# Patient Record
Sex: MALE | Race: BLACK OR AFRICAN AMERICAN | Employment: OTHER | URBAN - METROPOLITAN AREA
[De-identification: names, ages, dates, MRNs, and addresses within clinical notes are randomized per-mention and may not be internally consistent; named-entity substitution may affect disease eponyms.]

---

## 2017-01-27 ENCOUNTER — OFFICE VISIT (OUTPATIENT)
Dept: INTERNAL MEDICINE CLINIC | Age: 77
End: 2017-01-27

## 2017-01-27 VITALS
SYSTOLIC BLOOD PRESSURE: 146 MMHG | WEIGHT: 143.8 LBS | DIASTOLIC BLOOD PRESSURE: 92 MMHG | OXYGEN SATURATION: 94 % | TEMPERATURE: 98.5 F | HEART RATE: 104 BPM | HEIGHT: 64 IN | BODY MASS INDEX: 24.55 KG/M2

## 2017-01-27 DIAGNOSIS — J06.9 UPPER RESPIRATORY TRACT INFECTION, UNSPECIFIED TYPE: Primary | ICD-10-CM

## 2017-01-27 DIAGNOSIS — J20.9 ACUTE BRONCHITIS, UNSPECIFIED ORGANISM: ICD-10-CM

## 2017-01-27 RX ORDER — CODEINE PHOSPHATE AND GUAIFENESIN 10; 100 MG/5ML; MG/5ML
5 SOLUTION ORAL
Qty: 180 ML | Refills: 3 | Status: SHIPPED | OUTPATIENT
Start: 2017-01-27 | End: 2018-12-31 | Stop reason: ALTCHOICE

## 2017-01-27 RX ORDER — AZITHROMYCIN 250 MG/1
TABLET, FILM COATED ORAL
Qty: 6 TAB | Refills: 0 | Status: SHIPPED | OUTPATIENT
Start: 2017-01-27 | End: 2017-02-01

## 2017-01-27 NOTE — MR AVS SNAPSHOT
Visit Information Date & Time Provider Department Dept. Phone Encounter #  
 1/27/2017  1:00 PM Kaycee Anderson MD [facility] [phone] [id number] Your Appointments 6/1/2017  9:00 AM  
Any with Oliva Olsen MD  
[address] [place] [address] and Primary Care [address] [facility]) Appt Note: 6 mo f/up Sharlene Laird 90 1 [facility]  
  
   
 Sharlene Laird 90 [id number] Upcoming Health Maintenance Date Due FOBT Q 1 YEAR AGE 50-75 6/16/2017 GLAUCOMA SCREENING Q2Y 6/29/2017 MEDICARE YEARLY EXAM 12/2/2017 DTaP/Tdap/Td series (2 - Td) 12/1/2026 Allergies as of 1/27/2017  Review Complete On: 1/27/2017 By: Cindy Manley No Known Allergies Current Immunizations  Never Reviewed No immunizations on file. Not reviewed this visit You Were Diagnosed With   
  
 Codes Comments Upper respiratory tract infection, unspecified type    -  Primary ICD-10-CM: J06.9 ICD-9-CM: 465.9 Vitals BP Pulse Temp Height(growth percentile) Weight(growth percentile) SpO2  
 (!) 146/92 (BP 1 Location: Left arm, BP Patient Position: Sitting) (!) 104 98.5 °F (36.9 °C) (Oral) 5' 4\" (1.626 m) 143 lb 12.8 oz (65.2 kg) 94% BMI Smoking Status 24.68 kg/m2 Never Smoker BMI and BSA Data Body Mass Index Body Surface Area  
 24.68 kg/m 2 1.72 m 2 Preferred Pharmacy Pharmacy Name Phone [facility][id number]- [place], [place] - [address] [phone] Your Updated Medication List  
  
   
This list is accurate as of: 1/27/17  3:02 PM.  Always use your most recent med list.  
  
  
  
  
 albuterol 2.5 mg /3 mL (0.083 %) nebulizer solution Commonly known as:  PROVENTIL VENTOLIN  
3 mL by Nebulization route every four (4) hours as needed for Wheezing. azithromycin 250 mg tablet Commonly known as:  Ailin Abu Take 2 tablets today, then take 1 tablet daily  
  
 guaiFENesin-codeine 100-10 mg/5 mL solution Commonly known as:  ROBITUSSIN AC Take 5 mL by mouth four (4) times daily as needed for Cough. Max Daily Amount: 20 mL. Prescriptions Printed Refills  
 guaiFENesin-codeine (ROBITUSSIN AC) 100-10 mg/5 mL solution 3 Sig: Take 5 mL by mouth four (4) times daily as needed for Cough. Max Daily Amount: 20 mL. Class: Print Route: Oral  
  
Prescriptions Sent to Pharmacy Refills  
 azithromycin (ZITHROMAX) 250 mg tablet 0 Sig: Take 2 tablets today, then take 1 tablet daily Class: Normal  
 Pharmacy: [facility]/pharmacy [address] [facility] [address] Ph #: [phone] Introducing [facility]! Leslie Hortno introduces [employer] patient portal. Now you can access parts of your medical record, email your doctor's office, and request medication refills online. 1. In your internet browser, go to [email or web address] 2. Click on the First Time User? Click Here link in the Sign In box. You will see the New Member Sign Up page. 3. Enter your [employer] Access Code exactly as it appears below. You will not need to use this code after youve completed the sign-up process. If you do not sign up before the expiration date, you must request a new code. · [employer] Access Code: [email or web address] Expires: 3/1/2017  1:38 PM 
 
4. Enter the last four digits of your Social Security Number (xxxx) and Date of Birth (mm/dd/yyyy) as indicated and click Submit. You will be taken to the next sign-up page. 5. Create a Loomt ID. This will be your [employer] login ID and cannot be changed, so think of one that is secure and easy to remember. 6. Create a Loomt password. You can change your password at any time. 7. Enter your Password Reset Question and Answer.  This can be used at a later time if you forget your password. 8. Enter your e-mail address. You will receive e-mail notification when new information is available in [address] 9. Click Sign Up. You can now view and download portions of your medical record. 10. Click the Download Summary menu link to download a portable copy of your medical information. If you have questions, please visit the Frequently Asked Questions section of the "[employer] website. Remember, "[employer] is NOT to be used for urgent needs. For medical emergencies, dial 911. Now available from your iPhone and Android! Please provide this summary of care documentation to your next provider. Your primary care clinician is listed as Barbara Brand. If you have any questions after today's visit, please call [phone].

## 2017-01-27 NOTE — PROGRESS NOTES
Chief Complaint   Patient presents with    Chest Congestion    Cold     grey phlem     1. Have you been to the ER, urgent care clinic since your last visit? Hospitalized since your last visit? No    2. Have you seen or consulted any other health care providers outside of the [address] [place] [address] since your last visit? Include any pap smears or colon screening.  No

## 2017-01-28 NOTE — PROGRESS NOTES
Chief Complaint   Patient presents with    Chest Congestion    Cold     grey phlem   . SUBECTIVE:    Kavya Max is a 78 y.o. male comes in complaining of a two day history of nasal congestion, coughing which is productive of yellowish mucus. He wishes to have something for this. Current Outpatient Prescriptions   Medication Sig Dispense Refill    guaiFENesin-codeine (ROBITUSSIN AC) 100-10 mg/5 mL solution Take 5 mL by mouth four (4) times daily as needed for Cough. Max Daily Amount: 20 mL. 180 mL 3    azithromycin (ZITHROMAX) 250 mg tablet Take 2 tablets today, then take 1 tablet daily 6 Tab 0    albuterol (PROVENTIL VENTOLIN) 2.5 mg /3 mL (0.083 %) nebulizer solution 3 mL by Nebulization route every four (4) hours as needed for Wheezing. 48 Each 11     Past Medical History   Diagnosis Date    Back pain     Brachycephaly 11/2006    Cancer [facility]) 2006    Cancer of prostate w/high recur risk (T3a or Darion 8-10 or PSA>20) ([facility] [address]     Colonoscopy refused 12/01/2016     not this year    Fall at home     Hard palate abscess     Lipoma of flank     PSA elevation     S/P colonoscopy 8-11-06    Toothache      History reviewed. No pertinent past surgical history.   No Known Allergies    REVIEW OF SYSTEMS:  Review of Systems - Negative except   ENT ROS: negative for - headaches, hearing change, nasal congestion, oral lesions, tinnitus, visual changes or   Respiratory ROS: no cough, shortness of breath, or wheezing  Cardiovascular ROS: no chest pain or dyspnea on exertion  Gastrointestinal ROS: no abdominal pain, change in bowel habits, or black or blood  Genito-Urinary ROS: no dysuria, trouble voiding, or hematuria  Musculoskeletal ROS: negative  Neurological ROS: no TIA or stroke symptoms      Social History     Social History    Marital status: SINGLE     Spouse name: N/A    Number of children: N/A    Years of education: N/A     Social History Main Topics    Smoking status: Never Smoker    Smokeless tobacco: Never Used    Alcohol use No    Drug use: No    Sexual activity: Not Asked     Other Topics Concern    None     Social History Narrative    Family History: Mother: , natural causesFather: , natural causesSister(s): aliveBrother(s): aliveDaughter(s):    aliveGrandmother: alive8 brother(s) , 5 sister(s) - healthy. 1daughter(s) - healthy. Social History: Alcohol Use Patient does not use alcohol. Smoking Status Patient is a former smoker, Quit in: 40 years ago. Caffeine: none,    coffee, per day, soda diet, occasional, tea. Marital Status: Single. Lives w ith: alone w ith grandson. Children: yes, daughters. Occupation/W ork:    retired.   r  History reviewed. No pertinent family history. OBJECTIVE:  Visit Vitals    BP (!) 146/92 (BP 1 Location: Left arm, BP Patient Position: Sitting)    Pulse (!) 104    Temp 98.5 °F (36.9 °C) (Oral)    Ht 5' 4\" (1.626 m)    Wt 143 lb 12.8 oz (65.2 kg)    SpO2 94%    BMI 24.68 kg/m2     ENT: perrla,  eom intact  NECK: supple. Thyroid normal, no JVD  CHEST: clear to ascultation and percussion   HEART: regular rate and rhythm  ABD: soft, bowel sounds active,   EXTREMITIES: no edema, pulse 1+  INTEGUMENT: clear      ASSESSMENT:  1. Upper respiratory tract infection, unspecified type    2. Acute bronchitis, unspecified organism        PLAN:  The patient has an upper respiratory infection complicated by bronchitis. He will be given a cough suppressant and an antibiotic. I suggest he also use Afrin nasal spray one spray in each nostril before he goes to bed at night. Interestingly he actually took Pseudoephedrine. I suggest he discontinue this. .  Orders Placed This Encounter    guaiFENesin-codeine (ROBITUSSIN AC) 100-10 mg/5 mL solution    azithromycin (ZITHROMAX) 250 mg tablet       Follow-up Disposition:  Return keep old apt.       Gregor Nelson MD

## 2017-02-17 RX ORDER — ALBUTEROL SULFATE 0.83 MG/ML
2.5 SOLUTION RESPIRATORY (INHALATION)
Qty: 50 EACH | Refills: 11 | Status: SHIPPED | OUTPATIENT
Start: 2017-02-17 | End: 2019-02-04 | Stop reason: SDUPTHER

## 2017-04-27 ENCOUNTER — TELEPHONE (OUTPATIENT)
Dept: INTERNAL MEDICINE CLINIC | Age: 77
End: 2017-04-27

## 2017-04-27 NOTE — TELEPHONE ENCOUNTER
Patient called to schedule yearly medicare wellness appointment. No answer, qasim was left to call office back.

## 2018-12-31 ENCOUNTER — OFFICE VISIT (OUTPATIENT)
Dept: INTERNAL MEDICINE CLINIC | Age: 78
End: 2018-12-31

## 2018-12-31 VITALS
SYSTOLIC BLOOD PRESSURE: 122 MMHG | HEIGHT: 64 IN | RESPIRATION RATE: 18 BRPM | OXYGEN SATURATION: 96 % | DIASTOLIC BLOOD PRESSURE: 81 MMHG | HEART RATE: 89 BPM | WEIGHT: 148.5 LBS | TEMPERATURE: 98.7 F | BODY MASS INDEX: 25.35 KG/M2

## 2018-12-31 DIAGNOSIS — R79.9 ABNORMAL FINDING OF BLOOD CHEMISTRY: ICD-10-CM

## 2018-12-31 DIAGNOSIS — M54.50 LEFT-SIDED LOW BACK PAIN WITHOUT SCIATICA, UNSPECIFIED CHRONICITY: ICD-10-CM

## 2018-12-31 DIAGNOSIS — J45.41 MODERATE PERSISTENT ASTHMATIC BRONCHITIS WITH ACUTE EXACERBATION: ICD-10-CM

## 2018-12-31 DIAGNOSIS — D17.1 LIPOMA OF FLANK: Primary | ICD-10-CM

## 2018-12-31 DIAGNOSIS — C61 PROSTATE CA (HCC): ICD-10-CM

## 2018-12-31 RX ORDER — CEFUROXIME AXETIL 250 MG/1
250 TABLET ORAL 2 TIMES DAILY
Qty: 10 TAB | Refills: 0 | Status: SHIPPED | OUTPATIENT
Start: 2018-12-31 | End: 2019-02-04 | Stop reason: ALTCHOICE

## 2018-12-31 NOTE — PROGRESS NOTES
[facility]  So Del Real MD, [address] [address]  Phone:  [phone]  Fax: [phone]       Chief Complaint   Patient presents with    Cold Symptoms   . SUBJECTIVE:    Ellis Tao is a 70 y.o. male Patient returns today with a known history of prostate cancer, back pain, bronchial asthma and is seen for evaluation. Over the past couple weeks he has complained of feeling tired, is fatigued, cough productive of mucopurulent sputum, rhinorrhea, chest discomfort aggravated by the cough and has tried Tylenol, Robitussin, Claritin-D, honey lemon and tea, soups, but has a sore throat, nose still running, chest still burns when he coughs, and is seen for evaluation. Current Outpatient Medications   Medication Sig Dispense Refill    cefUROXime (CEFTIN) 250 mg tablet Take 1 Tab by mouth two (2) times a day. 10 Tab 0    albuterol (PROVENTIL VENTOLIN) 2.5 mg /3 mL (0.083 %) nebulizer solution 3 mL by Nebulization route every four (4) hours as needed for Wheezing. 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 No pertinent surgical history.   No Known Allergies      REVIEW OF SYSTEMS:  General: negative for - chills or fever  ENT: negative for - headaches, nasal congestion or tinnitus  Respiratory: negative for - cough, hemoptysis, shortness of breath or wheezing  Cardiovascular : negative for - chest pain, edema, palpitations or shortness of breath  Gastrointestinal: negative for - abdominal pain, blood in stools, heartburn or nausea/vomiting  Genito-Urinary: no dysuria, trouble voiding, or hematuria  Musculoskeletal: negative for - gait disturbance, joint pain, joint stiffness or joint swelling  Neurological: no TIA or stroke symptoms  Hematologic: no bruises, no bleeding, no swollen glands  Integument: no lumps, mole changes, nail changes or rash  Endocrine: no malaise/lethargy or unexpected weight changes      Social History     Socioeconomic History    Marital status: SINGLE     Spouse name: Not on file    Number of children: Not on file    Years of education: Not on file    Highest education level: Not on file   Tobacco Use    Smoking status: Never Smoker    Smokeless tobacco: Never Used   Substance and Sexual Activity    Alcohol use: No    Drug use: No    Sexual activity: Yes     Partners: Female   Social History Narrative    Family History: Mother: , natural causesFather: , natural causesSister(s): aliveBrother(s): aliveDaughter(s):    aliveGrandmother: alive8 brother(s) , 5 sister(s) - healthy. 1daughter(s) - healthy. Social History: Alcohol Use Patient does not use alcohol. Smoking Status Patient is a former smoker, Quit in: 40 years ago. Caffeine: none,    coffee, per day, soda diet, occasional, tea. Marital Status: Single. Lives w ith: alone w ith grandson. Children: yes, daughters. Occupation/W ork:    retired. History reviewed. No pertinent family history. OBJECTIVE:    Visit Vitals  /81   Pulse 89   Temp 98.7 °F (37.1 °C) (Oral)   Resp 18   Ht 5' 4\" (1.626 m)   Wt 148 lb 8 oz (67.4 kg)   SpO2 96%   BMI 25.49 kg/m²     CONSTITUTIONAL: well , well nourished, appears age appropriate  EYES: perrla, eom intact  ENMT:moist mucous membranes, pharynx clear  NECK: supple. Thyroid normal  RESPIRATORY: Chest: clear bilaterally   CARDIOVASCULAR: Heart: regular rate and rhythm  GASTROINTESTINAL: Abdomen: soft, bowel sounds active  HEMATOLOGIC: no pathological lymph nodes palpated  MUSCULOSKELETAL: Extremities: no edema, pulse 1+   INTEGUMENT: No unusual rashes or suspicious skin lesions noted.  Nails appear normal.  NEUROLOGIC: non-focal exam   MENTAL STATUS: alert and oriented, appropriate affect           ASSESSMENT:  1. Lipoma of flank    2. Left-sided low back pain without sciatica, unspecified chronicity    3. Prostate CA ([address]    4. Moderate persistent asthmatic bronchitis with acute exacerbation    5. Abnormal finding of blood chemistry       The patient's symptoms are compatible with acute asthmatic bronchitis, for which we will add Ceftin twice a day for the next seven days. Use the jet nebs on a regular basis until the coughing subsides, which I think is a variant of cough variant asthma or reactive bronchospasm related to the upper respiratory symptoms. His BMI is at his ideal body weight. His blood pressure control is excellent. He will be back to see us as previously scheduled or in about a year. Patient has not had follow up of his prostate cancer for at least two years or since he last saw Dr. Valencia. Will refer him to Dr. Laura Gurrola for follow up and also ask for a PSA today. I have discussed the diagnosis with the patient and the intended plan as seen in the  orders above. The patient understands and agees with the plan. The patient has   received an after visit summary and questions were answered concerning  future plans  Patient labs and/or xrays were reviewed  Past records were reviewed. PLAN:  .  Orders Placed This Encounter    URINALYSIS W/ RFLX MICROSCOPIC    CBC WITH AUTOMATED DIFF    METABOLIC PANEL, COMPREHENSIVE    LIPID PANEL    HEMOGLOBIN A1C WITH EAG    PROSTATE SPECIFIC AG    REFERRAL TO UROLOGY    cefUROXime (CEFTIN) 250 mg tablet       Follow-up Disposition:  Return in about 1 year (around 12/31/2019). ATTENTION:   This medical record was transcribed using an electronic medical records system. Although proofread, it may and can contain electronic and spelling errors. Other human spelling and other errors may be present.   Corrections may be executed at a later time. Please feel free to contact us for any clarifications as needed.

## 2018-12-31 NOTE — PROGRESS NOTES
1. Have you been to the ER, urgent care clinic since your last visit? Hospitalized since your last visit? No    2. Have you seen or consulted any other health care providers outside of the [facility] since your last visit? Include any pap smears or colon screening.  No    complains of  Cold symptoms

## 2019-01-01 LAB
ALBUMIN SERPL-MCNC: 4.1 G/DL (ref 3.5–4.8)
ALBUMIN/GLOB SERPL: 1.3 {RATIO} (ref 1.2–2.2)
ALP SERPL-CCNC: 73 IU/L (ref 39–117)
ALT SERPL-CCNC: 13 IU/L (ref 0–44)
APPEARANCE UR: CLEAR
AST SERPL-CCNC: 23 IU/L (ref 0–40)
BASOPHILS # BLD AUTO: 0 X10E3/UL (ref 0–0.2)
BASOPHILS NFR BLD AUTO: 1 %
BILIRUB SERPL-MCNC: 1.1 MG/DL (ref 0–1.2)
BILIRUB UR QL STRIP: NEGATIVE
BUN SERPL-MCNC: 16 MG/DL (ref 8–27)
BUN/CREAT SERPL: 14 (ref 10–24)
CALCIUM SERPL-MCNC: 9 MG/DL (ref 8.6–10.2)
CHLORIDE SERPL-SCNC: 104 MMOL/L (ref 96–106)
CHOLEST SERPL-MCNC: 214 MG/DL (ref 100–199)
CO2 SERPL-SCNC: 26 MMOL/L (ref 20–29)
COLOR UR: YELLOW
CREAT SERPL-MCNC: 1.13 MG/DL (ref 0.76–1.27)
EOSINOPHIL # BLD AUTO: 0.2 X10E3/UL (ref 0–0.4)
EOSINOPHIL NFR BLD AUTO: 3 %
ERYTHROCYTE [DISTWIDTH] IN BLOOD BY AUTOMATED COUNT: 12.4 % (ref 12.3–15.4)
EST. AVERAGE GLUCOSE BLD GHB EST-MCNC: 100 MG/DL
GLOBULIN SER CALC-MCNC: 3.1 G/DL (ref 1.5–4.5)
GLUCOSE SERPL-MCNC: 76 MG/DL (ref 65–99)
GLUCOSE UR QL: NEGATIVE
HBA1C MFR BLD: 5.1 % (ref 4.8–5.6)
HCT VFR BLD AUTO: 44.3 % (ref 37.5–51)
HDLC SERPL-MCNC: 63 MG/DL
HGB BLD-MCNC: 14.1 G/DL (ref 13–17.7)
HGB UR QL STRIP: NEGATIVE
IMM GRANULOCYTES # BLD: 0 X10E3/UL (ref 0–0.1)
IMM GRANULOCYTES NFR BLD: 0 %
KETONES UR QL STRIP: NEGATIVE
LDLC SERPL CALC-MCNC: 135 MG/DL (ref 0–99)
LEUKOCYTE ESTERASE UR QL STRIP: NEGATIVE
LYMPHOCYTES # BLD AUTO: 2.5 X10E3/UL (ref 0.7–3.1)
LYMPHOCYTES NFR BLD AUTO: 36 %
MCH RBC QN AUTO: 33.7 PG (ref 26.6–33)
MCHC RBC AUTO-ENTMCNC: 31.8 G/DL (ref 31.5–35.7)
MCV RBC AUTO: 106 FL (ref 79–97)
MICRO URNS: NORMAL
MONOCYTES # BLD AUTO: 0.8 X10E3/UL (ref 0.1–0.9)
MONOCYTES NFR BLD AUTO: 12 %
NEUTROPHILS # BLD AUTO: 3.4 X10E3/UL (ref 1.4–7)
NEUTROPHILS NFR BLD AUTO: 48 %
NITRITE UR QL STRIP: NEGATIVE
PH UR STRIP: 6 [PH] (ref 5–7.5)
PLATELET # BLD AUTO: 243 X10E3/UL (ref 150–379)
POTASSIUM SERPL-SCNC: 4.6 MMOL/L (ref 3.5–5.2)
PROT SERPL-MCNC: 7.2 G/DL (ref 6–8.5)
PROT UR QL STRIP: NEGATIVE
PSA SERPL-MCNC: 0.7 NG/ML (ref 0–4)
RBC # BLD AUTO: 4.18 X10E6/UL (ref 4.14–5.8)
SODIUM SERPL-SCNC: 144 MMOL/L (ref 134–144)
SP GR UR: 1.02 (ref 1–1.03)
TRIGL SERPL-MCNC: 79 MG/DL (ref 0–149)
UROBILINOGEN UR STRIP-MCNC: 0.2 MG/DL (ref 0.2–1)
VLDLC SERPL CALC-MCNC: 16 MG/DL (ref 5–40)
WBC # BLD AUTO: 6.9 X10E3/UL (ref 3.4–10.8)

## 2019-02-04 ENCOUNTER — OFFICE VISIT (OUTPATIENT)
Dept: INTERNAL MEDICINE CLINIC | Age: 79
End: 2019-02-04

## 2019-02-04 VITALS
BODY MASS INDEX: 24.59 KG/M2 | TEMPERATURE: 98.5 F | HEART RATE: 100 BPM | HEIGHT: 64 IN | DIASTOLIC BLOOD PRESSURE: 80 MMHG | WEIGHT: 144 LBS | SYSTOLIC BLOOD PRESSURE: 130 MMHG | RESPIRATION RATE: 18 BRPM | OXYGEN SATURATION: 95 %

## 2019-02-04 DIAGNOSIS — C61 PROSTATE CA (HCC): ICD-10-CM

## 2019-02-04 DIAGNOSIS — M54.50 LEFT-SIDED LOW BACK PAIN WITHOUT SCIATICA, UNSPECIFIED CHRONICITY: Primary | ICD-10-CM

## 2019-02-04 RX ORDER — ALBUTEROL SULFATE 0.83 MG/ML
2.5 SOLUTION RESPIRATORY (INHALATION)
Qty: 50 EACH | Refills: 11 | Status: SHIPPED | OUTPATIENT
Start: 2019-02-04 | End: 2019-05-10 | Stop reason: SDUPTHER

## 2019-02-04 NOTE — PROGRESS NOTES
[facility]  Judi Morel MD, [address] [address]  Phone:  [phone]  Fax: [phone]      Chief Complaint   Patient presents with    Cough     pt states he has been coughing up mucous but it has been clear. SUBECTIVE:    Amy Smith is a 70 y.o. male Patient returns today with recent episode of acute asthmatic bronchitis with a history of low back pain, prostate cancer, lipoma of the flank, and is seen for evaluation. Patient returns today complaining of headaches, continued cough, as well as chest discomfort. Tried Robitussin Night Max, Tylenol. He was in contact with mold in a building that was less than ideal as far as air pollutants are concerned and is seen for evaluation. Complains of a cough productive of mucoid sputum. Current Outpatient Medications   Medication Sig Dispense Refill    albuterol (PROVENTIL VENTOLIN) 2.5 mg /3 mL (0.083 %) nebulizer solution 3 mL by Nebulization route every four (4) hours as needed for Wheezing. 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 [address] 2006    PSA elevation     S/P colonoscopy 8-11-06    Toothache      History reviewed. No pertinent surgical history. No Known Allergies    REVIEW OF SYSTEMS:   No hemoptysis. He has one tube of Albuterol left and it seemed to help last night.         Social History     Socioeconomic History    Marital status: SINGLE     Spouse name: Not on file    Number of children: Not on file    Years of education: Not on file    Highest education level: Not on file   Tobacco Use    Smoking status: Never Smoker    Smokeless tobacco: Never Used   Substance and Sexual Activity    Alcohol use: No    Drug use: No    Sexual activity: Yes     Partners: Female   Social History Narrative    Family History: Mother: , natural causesFather: , natural causesSister(s): aliveBrother(s): aliveDaughter(s):    aliveGrandmother: alive8 brother(s) , 5 sister(s) - healthy. 1daughter(s) - healthy. Social History: Alcohol Use Patient does not use alcohol. Smoking Status Patient is a former smoker, Quit in: 40 years ago. Caffeine: none,    coffee, per day, soda diet, occasional, tea. Marital Status: Single. Lives w ith: alone w ith grandson. Children: yes, daughters. Occupation/W ork:    retired.   r  History reviewed. No pertinent family history. OBJECTIVE:  Visit Vitals  /80   Pulse 100   Temp 98.5 °F (36.9 °C) (Oral)   Resp 18   Ht 5' 4\" (1.626 m)   Wt 144 lb (65.3 kg)   SpO2 95%   BMI 24.72 kg/m²     ENT: perrla,  eom intact  NECK: supple. Thyroid normal  CHEST: clear to ascultation and percussion   HEART: regular rate and rhythm  ABD: soft, bowel sounds active  EXTREMITIES: no edema, pulse 1+     Office Visit on 2018   Component Date Value Ref Range Status    Specific Gravity 2018 1.024  1.005 - 1.030 Final    pH (UA) 2018 6.0  5.0 - 7.5 Final    Color 2018 Yellow  Yellow Final    Appearance 2018 Clear  Clear Final    Leukocyte Esterase 2018 Negative  Negative Final    Protein 2018 Negative  Negative/Trace Final    Glucose 2018 Negative  Negative Final    Ketone 2018 Negative  Negative Final    Blood 2018 Negative  Negative Final    Bilirubin 2018 Negative  Negative Final    Urobilinogen 2018 0.2  0.2 - 1.0 mg/dL Final    Nitrites 2018 Negative  Negative Final    Microscopic Examination 2018 Comment   Final    Microscopic not indicated and not performed.     WBC 2018 6.9  3.4 - 10.8 x10E3/uL Final    RBC 2018 4.18  4.14 - 5.80 x10E6/uL Final    HGB 2018 14.1  13.0 - 17.7 g/dL Final    HCT 2018 44.3  37.5 - 51.0 % Final    MCV 12/31/2018 106* 79 - 97 fL Final    MCH 12/31/2018 33.7* 26.6 - 33.0 pg Final    MCHC 12/31/2018 31.8  31.5 - 35.7 g/dL Final    RDW 12/31/2018 12.4  12.3 - 15.4 % Final    PLATELET 01/47/9483 232  150 - 379 x10E3/uL Final    NEUTROPHILS 12/31/2018 48  Not Estab. % Final    Lymphocytes 12/31/2018 36  Not Estab. % Final    MONOCYTES 12/31/2018 12  Not Estab. % Final    EOSINOPHILS 12/31/2018 3  Not Estab. % Final    BASOPHILS 12/31/2018 1  Not Estab. % Final    ABS. NEUTROPHILS 12/31/2018 3.4  1.4 - 7.0 x10E3/uL Final    Abs Lymphocytes 12/31/2018 2.5  0.7 - 3.1 x10E3/uL Final    ABS. MONOCYTES 12/31/2018 0.8  0.1 - 0.9 x10E3/uL Final    ABS. EOSINOPHILS 12/31/2018 0.2  0.0 - 0.4 x10E3/uL Final    ABS. BASOPHILS 12/31/2018 0.0  0.0 - 0.2 x10E3/uL Final    IMMATURE GRANULOCYTES 12/31/2018 0  Not Estab. % Final    ABS. IMM. GRANS. 12/31/2018 0.0  0.0 - 0.1 x10E3/uL Final    Glucose 12/31/2018 76  65 - 99 mg/dL Final    BUN 12/31/2018 16  8 - 27 mg/dL Final    Creatinine 12/31/2018 1.13  0.76 - 1.27 mg/dL Final    GFR est non-AA 12/31/2018 63  >59 mL/min/1.73 Final    GFR est AA 12/31/2018 73  >59 mL/min/1.73 Final    BUN/Creatinine ratio 12/31/2018 14  10 - 24 Final    Sodium 12/31/2018 144  134 - 144 mmol/L Final    Potassium 12/31/2018 4.6  3.5 - 5.2 mmol/L Final    Chloride 12/31/2018 104  96 - 106 mmol/L Final    CO2 12/31/2018 26  20 - 29 mmol/L Final    Calcium 12/31/2018 9.0  8.6 - 10.2 mg/dL Final    Protein, total 12/31/2018 7.2  6.0 - 8.5 g/dL Final    Albumin 12/31/2018 4.1  3.5 - 4.8 g/dL Final    GLOBULIN, TOTAL 12/31/2018 3.1  1.5 - 4.5 g/dL Final    A-G Ratio 12/31/2018 1.3  1.2 - 2.2 Final    Bilirubin, total 12/31/2018 1.1  0.0 - 1.2 mg/dL Final    Alk.  phosphatase 12/31/2018 73  39 - 117 IU/L Final    AST (SGOT) 12/31/2018 23  0 - 40 IU/L Final    ALT (SGPT) 12/31/2018 13  0 - 44 IU/L Final    Cholesterol, total 12/31/2018 214* 100 - 199 mg/dL Final    Triglyceride 12/31/2018 79  0 - 149 mg/dL Final    HDL Cholesterol 12/31/2018 63  >39 mg/dL Final    VLDL, calculated 12/31/2018 16  5 - 40 mg/dL Final    LDL, calculated 12/31/2018 135* 0 - 99 mg/dL Final    Hemoglobin A1c 12/31/2018 5.1  4.8 - 5.6 % Final    Comment:          Prediabetes: 5.7 - 6.4           Diabetes: >6.4           Glycemic control for adults with diabetes: <7.0      Estimated average glucose 12/31/2018 100  mg/dL Final    Prostate Specific Ag 12/31/2018 0.7  0.0 - 4.0 ng/mL Final    Comment: Roche ECLIA methodology. According to the American Urological Association, Serum PSA should  decrease and remain at undetectable levels after radical  prostatectomy. The AUA defines biochemical recurrence as an initial  PSA value 0.2 ng/mL or greater followed by a subsequent confirmatory  PSA value 0.2 ng/mL or greater. Values obtained with different assay methods or kits cannot be used  interchangeably. Results cannot be interpreted as absolute evidence  of the presence or absence of malignant disease. ASSESSMENT:  1. Left-sided low back pain without sciatica, unspecified chronicity    2. Prostate CA (HCC)      The bronchospasm is flaring. We will renew the Albuterol solution for his jet neb machine. At this point he does not need another course of antibiotics. If he develops chills, fever or mucopurulent sputum then we suggest he call us. He otherwise can see us in a couple months, sooner if he has any further problems. I have discussed the diagnosis with the patient and the intended plan as seen in the  orders above. The patient understands and agees with the plan. The patient has   received an after visit summary and questions were answered concerning  future plans  Patient labs and/or xrays were reviewed  Past records were reviewed.     PLAN:  .  Orders Placed This Encounter    albuterol (PROVENTIL VENTOLIN) 2.5 mg /3 mL (0.083 %) nebulizer solution       Follow-up Disposition:  Return in about 3 months (around 5/4/2019). ATTENTION:   This medical record was transcribed using an electronic medical records system. Although proofread, it may and can contain electronic and spelling errors. Other human spelling and other errors may be present. Corrections may be executed at a later time. Please feel free to contact us for any clarifications as needed.

## 2019-02-04 NOTE — PROGRESS NOTES
Chief Complaint   Patient presents with    Cough     pt states he has been coughing up mucous but it has been clear. 1. Have you been to the ER, urgent care clinic since your last visit? Hospitalized since your last visit? No    2. Have you seen or consulted any other health care providers outside of the [address] [place] [address] since your last visit? Include any pap smears or colon screening.  No

## 2019-05-10 RX ORDER — ALBUTEROL SULFATE 0.83 MG/ML
2.5 SOLUTION RESPIRATORY (INHALATION)
Qty: 50 EACH | Refills: 11 | Status: SHIPPED | OUTPATIENT
Start: 2019-05-10 | End: 2020-05-17

## 2020-03-26 PROBLEM — Z85.46 H/O PROSTATE CANCER: Status: ACTIVE | Noted: 2020-03-26

## 2020-05-17 RX ORDER — ALBUTEROL SULFATE 0.83 MG/ML
2.5 SOLUTION RESPIRATORY (INHALATION)
Qty: 150 ML | Refills: 11 | Status: SHIPPED | OUTPATIENT
Start: 2020-05-17 | End: 2021-12-01

## 2021-04-01 ENCOUNTER — OFFICE VISIT (OUTPATIENT)
Dept: INTERNAL MEDICINE CLINIC | Age: 81
End: 2021-04-01

## 2021-04-01 VITALS
HEART RATE: 93 BPM | BODY MASS INDEX: 24.79 KG/M2 | SYSTOLIC BLOOD PRESSURE: 126 MMHG | RESPIRATION RATE: 20 BRPM | HEIGHT: 64 IN | DIASTOLIC BLOOD PRESSURE: 82 MMHG | WEIGHT: 145.2 LBS | TEMPERATURE: 98.1 F | OXYGEN SATURATION: 97 %

## 2021-04-01 DIAGNOSIS — M54.50 LEFT-SIDED LOW BACK PAIN WITHOUT SCIATICA, UNSPECIFIED CHRONICITY: ICD-10-CM

## 2021-04-01 DIAGNOSIS — C61 PROSTATE CA (HCC): ICD-10-CM

## 2021-04-01 DIAGNOSIS — Z13.31 SCREENING FOR DEPRESSION: ICD-10-CM

## 2021-04-01 DIAGNOSIS — R79.9 ABNORMAL FINDING OF BLOOD CHEMISTRY, UNSPECIFIED: ICD-10-CM

## 2021-04-01 DIAGNOSIS — J45.41 MODERATE PERSISTENT ASTHMATIC BRONCHITIS WITH ACUTE EXACERBATION: ICD-10-CM

## 2021-04-01 DIAGNOSIS — Z00.00 MEDICARE ANNUAL WELLNESS VISIT, SUBSEQUENT: Primary | ICD-10-CM

## 2021-04-01 PROBLEM — R07.89 OTHER CHEST PAIN: Status: ACTIVE | Noted: 2021-04-01

## 2021-04-01 PROCEDURE — G0439 PPPS, SUBSEQ VISIT: HCPCS | Performed by: INTERNAL MEDICINE

## 2021-04-01 PROCEDURE — 99213 OFFICE O/P EST LOW 20 MIN: CPT | Performed by: INTERNAL MEDICINE

## 2021-04-01 NOTE — PROGRESS NOTES
SPORTS MEDICINE AND PRIMARY CARE  Keven Ballard MD, [address] [address]  Phone:  [phone]  Fax: [phone]       Chief Complaint   Patient presents with    Complete Physical    Annual Wellness Visit   . SUBJECTIVE:    Brielle Soto is a 80 y.o. male Patient returns today concerned about some chest discomfort he had. 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 He also is concerned about allergies. He is going to have a COVID vaccine with both himself and [employer] next week. During the field trip last year, when driving the school bus, he developed nausea, headache, chest pain and took some Pepto Bismol and Robitussin DM with some relief. Patient is seen for evaluation. Current Outpatient Medications   Medication Sig Dispense Refill    albuterol (PROVENTIL VENTOLIN) 2.5 mg /3 mL (0.083 %) nebu 3 ML BY NEBULIZATION ROUTE EVERY FOUR (4) HOURS AS NEEDED FOR WHEEZING. 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 No pertinent surgical history.   No Known Allergies      REVIEW OF SYSTEMS:  General: negative for - chills or fever  ENT: negative for - headaches, nasal congestion or tinnitus  Respiratory: negative for - cough, hemoptysis, shortness of breath or wheezing  Cardiovascular : negative for - chest pain, edema, palpitations or shortness of breath  Gastrointestinal: negative for - abdominal pain, blood in stools, heartburn or nausea/vomiting  Genito-Urinary: no dysuria, trouble voiding, or hematuria  Musculoskeletal: negative for - gait disturbance, joint pain, joint stiffness or joint swelling  Neurological: no TIA or stroke symptoms  Hematologic: no bruises, no bleeding, no swollen glands  Integument: no lumps, mole changes, nail changes or rash  Endocrine: no malaise/lethargy or unexpected weight changes      Social History     Socioeconomic History    Marital status: SINGLE     Spouse name: Not on file    Number of children: Not on file    Years of education: Not on file    Highest education level: Not on file   Tobacco Use    Smoking status: Never Smoker    Smokeless tobacco: Never Used   Substance and Sexual Activity    Alcohol use: No    Drug use: No    Sexual activity: Yes     Partners: Female   Social History Narrative    Family History: Mother: , natural causesFather: , natural causesSister(s): aliveBrother(s): aliveDaughter(s):    aliveGrandmother: alive8 brother(s) , 5 sister(s) - healthy. 1daughter(s) - healthy. Social History: Alcohol Use Patient does not use alcohol. Smoking Status Patient is a former smoker, Quit in: 40 years ago. Caffeine: none,    coffee, per day, soda diet, occasional, tea. Marital Status: Single. Lives w ith: alone w ith grandson. Children: yes, daughters. Occupation/W ork:    retired. History reviewed. No pertinent family history. OBJECTIVE:    Visit Vitals  /82   Pulse 93   Temp 98.1 °F (36.7 °C) (Oral)   Resp 20   Ht 5' 4\" (1.626 m)   Wt 145 lb 3.2 oz (65.9 kg)   SpO2 97%   BMI 24.92 kg/m²     CONSTITUTIONAL: well , well nourished, appears age appropriate  EYES: perrla, eom intact  ENMT:moist mucous membranes, pharynx clear  NECK: supple.  Thyroid normal  RESPIRATORY: Chest: clear bilaterally   CARDIOVASCULAR: Heart: regular rate and rhythm  GASTROINTESTINAL: Abdomen: soft, bowel sounds active  HEMATOLOGIC: no pathological lymph nodes palpated  MUSCULOSKELETAL: Extremities: no edema, pulse 1+   INTEGUMENT: No unusual rashes or suspicious skin lesions noted. Nails appear normal.  NEUROLOGIC: non-focal exam   MENTAL STATUS: alert and oriented, appropriate affect           ASSESSMENT:  1. Moderate persistent asthmatic bronchitis with acute exacerbation    2. Medicare annual wellness visit, subsequent    3. Screening for depression    4. Prostate CA ([facility] [address]    5. Left-sided low back pain without sciatica, unspecified chronicity      Patient is feeling better. We will do a chest x-ray regarding anterior chest discomfort that is non cardiac in origin. He tried the nebulizer, which apparently gave him some relief. He does not have any bronchospasm on exam today. There is a history of prostate cancer and we will refer him to Dr. Mane Anguiano for evaluation of the PSA and we will send results of the PSA to Dr. Mane Anguiano, as well as to patient in the mail. He will be back to see us in about a year, sooner if he has any problems. I have discussed the diagnosis with the patient and the intended plan as seen in the  Orders. The patient understands and agees with the plan. The patient has   received an after visit summary and questions were answered concerning  future plans  Patient labs and/or xrays were reviewed  Past records were reviewed. PLAN:  . No orders of the defined types were placed in this encounter. ATTENTION:   This medical record was transcribed using an electronic medical records system. Although proofread, it may and can contain electronic and spelling errors. Other human spelling and other errors may be present. Corrections may be executed at a later time. Please feel free to contact us for any clarifications as needed.

## 2021-04-01 NOTE — PATIENT INSTRUCTIONS
Medicare Wellness Visit, Male    The best way to live healthy is to have a lifestyle where you eat a well-balanced diet, exercise regularly, limit alcohol use, and quit all forms of tobacco/nicotine, if applicable. Regular preventive services are another way to keep healthy. Preventive services (vaccines, screening tests, monitoring & exams) can help personalize your care plan, which helps you manage your own care. Screening tests can find health problems at the earliest stages, when they are easiest to treat. [facility] follows the current, evidence-based guidelines published by the [facility] ([facility]) when recommending preventive services for our patients. Because we follow these guidelines, sometimes recommendations change over time as research supports it. (For example, a prostate screening blood test is no longer routinely recommended for men with no symptoms). Of course, you and your doctor may decide to screen more often for some diseases, based on your risk and co-morbidities (chronic disease you are already diagnosed with). Preventive services for you include:  - Medicare offers their members a free annual wellness visit, which is time for you and your primary care provider to discuss and plan for your preventive service needs. Take advantage of this benefit every year!  -All adults over age 72 should receive the recommended pneumonia vaccines. Current USPSTF guidelines recommend a series of two vaccines for the best pneumonia protection.   -All adults should have a flu vaccine yearly and tetanus vaccine every 10 years.  -All adults age 48 and older should receive the shingles vaccines (series of two vaccines).        -All adults age 38-68 who are overweight should have a diabetes screening test once every three years.   -Other screening tests & preventive services for persons with diabetes include: an eye exam to screen for diabetic retinopathy, a kidney function test, a foot exam, and stricter control over your cholesterol.   -Cardiovascular screening for adults with routine risk involves an electrocardiogram (ECG) at intervals determined by the provider.   -Colorectal cancer screening should be done for adults age 54-65 with no increased risk factors for colorectal cancer. There are a number of acceptable methods of screening for this type of cancer. Each test has its own benefits and drawbacks. Discuss with your provider what is most appropriate for you during your annual wellness visit. The different tests include: colonoscopy (considered the best screening method), a fecal occult blood test, a fecal DNA test, and sigmoidoscopy.  -All adults born between [facility] should be screened once for Hepatitis C.  -An Abdominal Aortic Aneurysm (AAA) Screening is recommended for men age 73-68 who has ever smoked in their lifetime.      Here is a list of your current Health Maintenance items (your personalized list of preventive services) with a due date:  Health Maintenance Due   Topic Date Due    Hepatitis C Test  Never done    COVID-19 Vaccine (1) Never done    Shingles Vaccine (1 of 2) Never done    Pneumococcal Vaccine (1 of 1 - PPSV23) Never done

## 2021-04-01 NOTE — PROGRESS NOTES
Leena Franklin is a 80 y.o. male  Chief Complaint   Patient presents with    Complete Physical    Annual Wellness Visit     Health Maintenance Due   Topic Date Due    Hepatitis C Screening  Never done    COVID-19 Vaccine (1) Never done    Shingrix Vaccine Age 50> (1 of 2) Never done    Pneumococcal 65+ years (1 of 1 - PPSV23) Never done    Medicare Yearly Exam  03/14/2018     Visit Vitals  /82   Pulse 93   Temp 98.1 °F (36.7 °C) (Oral)   Resp 20   Ht 5' 4\" (1.626 m)   Wt 145 lb 3.2 oz (65.9 kg)   SpO2 97%   BMI 24.92 kg/m²     This is the Subsequent Medicare Annual Wellness Exam, performed 12 months or more after the Initial AWV or the last Subsequent AWV    I have reviewed the patient's medical history in detail and updated the computerized patient record. Depression Risk Factor Screening:     3 most recent PHQ Screens 2/4/2019   PHQ Not Done -   Little interest or pleasure in doing things Not at all   Feeling down, depressed, irritable, or hopeless Not at all   Total Score PHQ 2 0       Alcohol Risk Screen    Do you average more than 1 drink per night or more than 7 drinks a week: No    In the past three months have you have had more than 4 drinks containing alcohol on one occasion: No        Functional Ability and Level of Safety:    Hearing: Hearing is good. Activities of Daily Living: The home contains: no safety equipment. Patient does total self care      Ambulation: with no difficulty     Fall Risk:  Fall Risk Assessment, last 12 mths 2/4/2019   Able to walk? Yes   Fall in past 12 months?  No      Abuse Screen:  Patient is not abused       Cognitive Screening    Has your family/caregiver stated any concerns about your memory: no     Cognitive Screening: not necessary    Assessment/Plan   Education and counseling provided:  Are appropriate based on today's review and evaluation        Health Maintenance Due     Health Maintenance Due   Topic Date Due    Hepatitis C Screening  Never done    COVID-19 Vaccine (1) Never done    Shingrix Vaccine Age 50> (1 of 2) Never done    Pneumococcal 65+ years (1 of 1 - PPSV23) Never done    Medicare Yearly Exam  03/14/2018       Patient Care Team   Patient Care Team:  Julio Cesar Padilla MD as PCP - General (Internal Medicine)  Julio Cesar Padilla MD as PCP - [facility] Empaneled Provider    History     Patient Active Problem List   Diagnosis Code    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    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    Colonoscopy refused Z53.20    Asthmatic bronchitis J45.909    H/O prostate cancer Z85.46     Past Medical History:   Diagnosis Date    Asthmatic bronchitis     Back pain     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 No pertinent surgical history. Current Outpatient Medications   Medication Sig Dispense Refill    albuterol (PROVENTIL VENTOLIN) 2.5 mg /3 mL (0.083 %) nebu 3 ML BY NEBULIZATION ROUTE EVERY FOUR (4) HOURS AS NEEDED FOR WHEEZING. 150 mL 11     No Known Allergies    History reviewed. No pertinent family history. Social History     Tobacco Use    Smoking status: Never Smoker    Smokeless tobacco: Never Used   Substance Use Topics    Alcohol use:  No

## 2021-04-02 LAB
ALBUMIN SERPL-MCNC: 3.6 G/DL (ref 3.5–5)
ALBUMIN/GLOB SERPL: 1.1 {RATIO} (ref 1.1–2.2)
ALP SERPL-CCNC: 78 U/L (ref 45–117)
ALT SERPL-CCNC: 16 U/L (ref 12–78)
ANION GAP SERPL CALC-SCNC: 2 MMOL/L (ref 5–15)
APPEARANCE UR: CLEAR
AST SERPL-CCNC: 18 U/L (ref 15–37)
BASOPHILS # BLD: 0.1 K/UL (ref 0–0.1)
BASOPHILS NFR BLD: 2 % (ref 0–1)
BILIRUB SERPL-MCNC: 1 MG/DL (ref 0.2–1)
BILIRUB UR QL: NEGATIVE
BUN SERPL-MCNC: 19 MG/DL (ref 6–20)
BUN/CREAT SERPL: 19 (ref 12–20)
CALCIUM SERPL-MCNC: 8.7 MG/DL (ref 8.5–10.1)
CHLORIDE SERPL-SCNC: 110 MMOL/L (ref 97–108)
CHOLEST SERPL-MCNC: 210 MG/DL
CO2 SERPL-SCNC: 28 MMOL/L (ref 21–32)
COLOR UR: NORMAL
CREAT SERPL-MCNC: 1 MG/DL (ref 0.7–1.3)
DIFFERENTIAL METHOD BLD: ABNORMAL
EOSINOPHIL # BLD: 0.2 K/UL (ref 0–0.4)
EOSINOPHIL NFR BLD: 3 % (ref 0–7)
ERYTHROCYTE [DISTWIDTH] IN BLOOD BY AUTOMATED COUNT: 11.5 % (ref 11.5–14.5)
EST. AVERAGE GLUCOSE BLD GHB EST-MCNC: 97 MG/DL
GLOBULIN SER CALC-MCNC: 3.3 G/DL (ref 2–4)
GLUCOSE SERPL-MCNC: 84 MG/DL (ref 65–100)
GLUCOSE UR STRIP.AUTO-MCNC: NEGATIVE MG/DL
HBA1C MFR BLD: 5 % (ref 4–5.6)
HCT VFR BLD AUTO: 43.6 % (ref 36.6–50.3)
HDLC SERPL-MCNC: 80 MG/DL
HDLC SERPL: 2.6 {RATIO} (ref 0–5)
HGB BLD-MCNC: 13.5 G/DL (ref 12.1–17)
HGB UR QL STRIP: NEGATIVE
IMM GRANULOCYTES # BLD AUTO: 0 K/UL (ref 0–0.04)
IMM GRANULOCYTES NFR BLD AUTO: 0 % (ref 0–0.5)
KETONES UR QL STRIP.AUTO: NEGATIVE MG/DL
LDLC SERPL CALC-MCNC: 117.8 MG/DL (ref 0–100)
LEUKOCYTE ESTERASE UR QL STRIP.AUTO: NEGATIVE
LIPID PROFILE,FLP: ABNORMAL
LYMPHOCYTES # BLD: 2.2 K/UL (ref 0.8–3.5)
LYMPHOCYTES NFR BLD: 39 % (ref 12–49)
MCH RBC QN AUTO: 34.2 PG (ref 26–34)
MCHC RBC AUTO-ENTMCNC: 31 G/DL (ref 30–36.5)
MCV RBC AUTO: 110.4 FL (ref 80–99)
MONOCYTES # BLD: 0.7 K/UL (ref 0–1)
MONOCYTES NFR BLD: 12 % (ref 5–13)
NEUTS SEG # BLD: 2.5 K/UL (ref 1.8–8)
NEUTS SEG NFR BLD: 44 % (ref 32–75)
NITRITE UR QL STRIP.AUTO: NEGATIVE
NRBC # BLD: 0 K/UL (ref 0–0.01)
NRBC BLD-RTO: 0 PER 100 WBC
PH UR STRIP: 5.5 [PH] (ref 5–8)
PLATELET # BLD AUTO: 245 K/UL (ref 150–400)
PMV BLD AUTO: 10.3 FL (ref 8.9–12.9)
POTASSIUM SERPL-SCNC: 4.7 MMOL/L (ref 3.5–5.1)
PROT SERPL-MCNC: 6.9 G/DL (ref 6.4–8.2)
PROT UR STRIP-MCNC: NEGATIVE MG/DL
PSA SERPL-MCNC: 1.3 NG/ML (ref 0.01–4)
RBC # BLD AUTO: 3.95 M/UL (ref 4.1–5.7)
RBC MORPH BLD: ABNORMAL
SODIUM SERPL-SCNC: 140 MMOL/L (ref 136–145)
SP GR UR REFRACTOMETRY: 1.02
TRIGL SERPL-MCNC: 61 MG/DL (ref ?–150)
UROBILINOGEN UR QL STRIP.AUTO: 0.2 EU/DL (ref 0.2–1)
VLDLC SERPL CALC-MCNC: 12.2 MG/DL
WBC # BLD AUTO: 5.7 K/UL (ref 4.1–11.1)

## 2021-04-07 ENCOUNTER — IMMUNIZATION (OUTPATIENT)
Dept: INTERNAL MEDICINE CLINIC | Age: 81
End: 2021-04-07
Payer: MEDICARE

## 2021-04-07 DIAGNOSIS — Z23 ENCOUNTER FOR IMMUNIZATION: Primary | ICD-10-CM

## 2021-04-07 PROCEDURE — 0001A COVID-19, MRNA, LNP-S, PF, 30MCG/0.3ML DOSE(PFIZER): CPT | Performed by: FAMILY MEDICINE

## 2021-04-07 PROCEDURE — 91300 COVID-19, MRNA, LNP-S, PF, 30MCG/0.3ML DOSE(PFIZER): CPT | Performed by: FAMILY MEDICINE

## 2021-04-28 ENCOUNTER — IMMUNIZATION (OUTPATIENT)
Dept: INTERNAL MEDICINE CLINIC | Age: 81
End: 2021-04-28
Payer: MEDICARE

## 2021-04-28 DIAGNOSIS — Z23 ENCOUNTER FOR IMMUNIZATION: Primary | ICD-10-CM

## 2021-04-28 PROCEDURE — 91300 COVID-19, MRNA, LNP-S, PF, 30MCG/0.3ML DOSE(PFIZER): CPT | Performed by: FAMILY MEDICINE

## 2021-04-28 PROCEDURE — 0002A COVID-19, MRNA, LNP-S, PF, 30MCG/0.3ML DOSE(PFIZER): CPT | Performed by: FAMILY MEDICINE

## 2021-08-18 ENCOUNTER — OFFICE VISIT (OUTPATIENT)
Dept: INTERNAL MEDICINE CLINIC | Age: 81
End: 2021-08-18
Payer: MEDICARE

## 2021-08-18 VITALS
DIASTOLIC BLOOD PRESSURE: 79 MMHG | WEIGHT: 139.8 LBS | RESPIRATION RATE: 18 BRPM | OXYGEN SATURATION: 99 % | SYSTOLIC BLOOD PRESSURE: 128 MMHG | BODY MASS INDEX: 23.87 KG/M2 | HEIGHT: 64 IN | HEART RATE: 92 BPM | TEMPERATURE: 98.6 F

## 2021-08-18 DIAGNOSIS — R03.0 WHITE COAT SYNDROME WITH HIGH BLOOD PRESSURE BUT WITHOUT HYPERTENSION: Primary | ICD-10-CM

## 2021-08-18 DIAGNOSIS — H61.23 BILATERAL IMPACTED CERUMEN: ICD-10-CM

## 2021-08-18 PROCEDURE — 99213 OFFICE O/P EST LOW 20 MIN: CPT | Performed by: INTERNAL MEDICINE

## 2021-08-18 NOTE — PROGRESS NOTES
Chief Complaint   Patient presents with    Hypertension     concern about BP from work physical done (8/17)    Wax in Ear     1. Have you been to the ER, urgent care clinic since your last visit? Hospitalized since your last visit? No    2. Have you seen or consulted any other health care providers outside of the [address] [place] [address] since your last visit? Include any pap smears or colon screening.  No   Visit Vitals  /79   Pulse 92   Temp 98.6 °F (37 °C) (Oral)   Resp 18   Ht 5' 4\" (1.626 m)   Wt 139 lb 12.8 oz (63.4 kg)   SpO2 99%   BMI 24.00 kg/m²

## 2021-08-21 NOTE — PROGRESS NOTES
Chief Complaint   Patient presents with    Hypertension     concern about BP from work physical done (8/17)    Wax in Ear   . SUBECTIVE:    Bisi Maria is a 80 y.o. 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 Blood pressure was slightly elevated at the time and cerumen was noted. She suggested a cardiac clearance, but there is nothing to suggest a need for that other than his age of 80. Interestingly, he has no other cardiovascular risk factors. He is asymptomatic from a cardiac standpoint. Current Outpatient Medications   Medication Sig Dispense Refill    albuterol (PROVENTIL VENTOLIN) 2.5 mg /3 mL (0.083 %) nebu 3 ML BY NEBULIZATION ROUTE EVERY FOUR (4) HOURS AS NEEDED FOR WHEEZING. 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 No pertinent surgical history.   No Known Allergies    REVIEW OF SYSTEMS:  Review of Systems - Negative except   ENT ROS: negative for - headaches, hearing change, nasal congestion, oral lesions, tinnitus, visual changes or   Respiratory ROS: no cough, shortness of breath, or wheezing  Cardiovascular ROS: no chest pain or dyspnea on exertion  Gastrointestinal ROS: no abdominal pain, change in bowel habits, or black or blood  Genito-Urinary ROS: no dysuria, trouble voiding, or hematuria  Musculoskeletal ROS: negative  Neurological ROS: no TIA or stroke symptoms      Social History     Socioeconomic History    Marital status: SINGLE     Spouse name: Not on file    Number of children: Not on file    Years of education: Not on file    Highest education level: Not on file   Tobacco Use    Smoking status: Never Smoker    Smokeless tobacco: Never Used   Substance and Sexual Activity    Alcohol use: No    Drug use: No    Sexual activity: Yes     Partners: Female   Social History Narrative    Family History: Mother: , natural causesFather: , natural causesSister(s): aliveBrother(s): aliveDaughter(s):    aliveGrandmother: alive8 brother(s) , 5 sister(s) - healthy. 1daughter(s) - healthy. Social History: Alcohol Use Patient does not use alcohol. Smoking Status Patient is a former smoker, Quit in: 40 years ago. Caffeine: none,    coffee, per day, soda diet, occasional, tea. Marital Status: Single. Lives w ith: alone w ith grandson. Children: yes, daughters. Occupation/W ork:    retired. Social Determinants of Health     Financial Resource Strain:     Difficulty of Paying Living Expenses:    Food Insecurity:     Worried About Running Out of Food in the Last Year:     [address] in the Last Year:    Transportation Needs:     Lack of Transportation (Medical):  Lack of Transportation (Non-Medical):    Physical Activity:     Days of Exercise per Week:     Minutes of Exercise per Session:    Stress:     Feeling of Stress :    Social Connections:     Frequency of Communication with Friends and Family:     Frequency of Social Gatherings with Friends and Family:     Attends [employer] Services:     Active Member of Clubs or Organizations:     Attends Club or Organization Meetings:     Marital Status:    r  History reviewed. No pertinent family history. OBJECTIVE:  Visit Vitals  /79   Pulse 92   Temp 98.6 °F (37 °C) (Oral)   Resp 18   Ht 5' 4\" (1.626 m)   Wt 139 lb 12.8 oz (63.4 kg)   SpO2 99%   BMI 24.00 kg/m²     ENT: perrla,  eom intact, bilateral cerumen impaction  NECK: supple.  Thyroid normal, no JVD  CHEST: clear to ascultation and percussion   HEART: regular rate and rhythm  ABD: soft, bowel sounds active,   EXTREMITIES: no edema, pulse 1+  INTEGUMENT: clear      ASSESSMENT:  1. White coat syndrome with high blood pressure but without hypertension    2. Bilateral impacted cerumen        PLAN:  1. The patient's blood pressure is entirely normal after being taken on two separate occasions. 2. His ears were disimpacted. There is really no major change in his hearing, however. 3. There is no reason for a cardiac clearance for this gentleman. Follow-up and Dispositions    · Return Keep all appointment with PCP.            Gabe Mayers MD

## 2021-10-04 ENCOUNTER — HOSPITAL ENCOUNTER (EMERGENCY)
Age: 81
Discharge: HOME | End: 2021-10-05
Attending: EMERGENCY MEDICINE
Payer: MEDICARE

## 2021-10-04 ENCOUNTER — APPOINTMENT (OUTPATIENT)
Dept: CT IMAGING | Age: 81
End: 2021-10-04
Attending: EMERGENCY MEDICINE
Payer: MEDICARE

## 2021-10-04 VITALS
HEART RATE: 87 BPM | RESPIRATION RATE: 16 BRPM | SYSTOLIC BLOOD PRESSURE: 152 MMHG | DIASTOLIC BLOOD PRESSURE: 75 MMHG | BODY MASS INDEX: 23.71 KG/M2 | WEIGHT: 138.89 LBS | OXYGEN SATURATION: 100 % | HEIGHT: 64 IN | TEMPERATURE: 97.7 F

## 2021-10-04 DIAGNOSIS — K52.9 ENTERITIS: Primary | ICD-10-CM

## 2021-10-04 DIAGNOSIS — R93.5 ABNORMAL CT OF THE ABDOMEN: ICD-10-CM

## 2021-10-04 LAB
ALBUMIN SERPL-MCNC: 3.6 G/DL (ref 3.5–5)
ALBUMIN/GLOB SERPL: 0.8 {RATIO} (ref 1.1–2.2)
ALP SERPL-CCNC: 74 U/L (ref 45–117)
ALT SERPL-CCNC: 18 U/L (ref 12–78)
ANION GAP SERPL CALC-SCNC: 1 MMOL/L (ref 5–15)
APPEARANCE UR: CLEAR
AST SERPL-CCNC: 35 U/L (ref 15–37)
BACTERIA URNS QL MICRO: NEGATIVE /HPF
BASE EXCESS BLD CALC-SCNC: 5.4 MMOL/L
BILIRUB SERPL-MCNC: 1.1 MG/DL (ref 0.2–1)
BILIRUB UR QL: NEGATIVE
BUN SERPL-MCNC: 17 MG/DL (ref 6–20)
BUN/CREAT SERPL: 14 (ref 12–20)
CA-I BLD-MCNC: 1.21 MMOL/L (ref 1.12–1.32)
CALCIUM SERPL-MCNC: 9.1 MG/DL (ref 8.5–10.1)
CHLORIDE BLD-SCNC: 105 MMOL/L (ref 100–108)
CHLORIDE SERPL-SCNC: 106 MMOL/L (ref 97–108)
CO2 BLD-SCNC: 34 MMOL/L (ref 19–24)
CO2 SERPL-SCNC: 28 MMOL/L (ref 21–32)
COLOR UR: ABNORMAL
CREAT SERPL-MCNC: 1.22 MG/DL (ref 0.7–1.3)
CREAT UR-MCNC: 1.1 MG/DL (ref 0.6–1.3)
EPITH CASTS URNS QL MICRO: ABNORMAL /LPF
ERYTHROCYTE [DISTWIDTH] IN BLOOD BY AUTOMATED COUNT: 11.7 % (ref 11.5–14.5)
GLOBULIN SER CALC-MCNC: 4.3 G/DL (ref 2–4)
GLUCOSE BLD STRIP.AUTO-MCNC: 124 MG/DL (ref 74–106)
GLUCOSE SERPL-MCNC: 156 MG/DL (ref 65–100)
GLUCOSE UR STRIP.AUTO-MCNC: NEGATIVE MG/DL
HCO3 BLDA-SCNC: 34 MMOL/L
HCT VFR BLD AUTO: 42.5 % (ref 36.6–50.3)
HGB BLD-MCNC: 14.1 G/DL (ref 12.1–17)
HGB UR QL STRIP: NEGATIVE
KETONES UR QL STRIP.AUTO: 15 MG/DL
LACTATE BLD-SCNC: 2.58 MMOL/L (ref 0.4–2)
LEUKOCYTE ESTERASE UR QL STRIP.AUTO: NEGATIVE
LIPASE SERPL-CCNC: 68 U/L (ref 73–393)
MCH RBC QN AUTO: 34.6 PG (ref 26–34)
MCHC RBC AUTO-ENTMCNC: 33.2 G/DL (ref 30–36.5)
MCV RBC AUTO: 104.2 FL (ref 80–99)
NITRITE UR QL STRIP.AUTO: NEGATIVE
NRBC # BLD: 0 K/UL (ref 0–0.01)
NRBC BLD-RTO: 0 PER 100 WBC
PCO2 BLDV: 63.4 MMHG (ref 41–51)
PH BLDV: 7.34 [PH] (ref 7.32–7.42)
PH UR STRIP: 6 [PH] (ref 5–8)
PLATELET # BLD AUTO: 234 K/UL (ref 150–400)
PMV BLD AUTO: 9.3 FL (ref 8.9–12.9)
PO2 BLDV: 21 MMHG (ref 25–40)
POTASSIUM BLD-SCNC: 4.3 MMOL/L (ref 3.5–5.5)
POTASSIUM SERPL-SCNC: ABNORMAL MMOL/L (ref 3.5–5.1)
PROT SERPL-MCNC: 7.9 G/DL (ref 6.4–8.2)
PROT UR STRIP-MCNC: NEGATIVE MG/DL
RBC # BLD AUTO: 4.08 M/UL (ref 4.1–5.7)
RBC #/AREA URNS HPF: ABNORMAL /HPF (ref 0–5)
SERVICE CMNT-IMP: ABNORMAL
SODIUM BLD-SCNC: 146 MMOL/L (ref 136–145)
SODIUM SERPL-SCNC: 135 MMOL/L (ref 136–145)
SP GR UR REFRACTOMETRY: 1.02 (ref 1–1.03)
SPECIMEN SITE: ABNORMAL
UA: UC IF INDICATED,UAUC: ABNORMAL
UROBILINOGEN UR QL STRIP.AUTO: 0.2 EU/DL (ref 0.2–1)
WBC # BLD AUTO: 10.7 K/UL (ref 4.1–11.1)
WBC URNS QL MICRO: ABNORMAL /HPF (ref 0–4)

## 2021-10-04 PROCEDURE — 96361 HYDRATE IV INFUSION ADD-ON: CPT

## 2021-10-04 PROCEDURE — 96374 THER/PROPH/DIAG INJ IV PUSH: CPT

## 2021-10-04 PROCEDURE — 82947 ASSAY GLUCOSE BLOOD QUANT: CPT

## 2021-10-04 PROCEDURE — 85027 COMPLETE CBC AUTOMATED: CPT

## 2021-10-04 PROCEDURE — 36415 COLL VENOUS BLD VENIPUNCTURE: CPT

## 2021-10-04 PROCEDURE — 83690 ASSAY OF LIPASE: CPT

## 2021-10-04 PROCEDURE — 74011250636 HC RX REV CODE- 250/636: Performed by: EMERGENCY MEDICINE

## 2021-10-04 PROCEDURE — 99284 EMERGENCY DEPT VISIT MOD MDM: CPT

## 2021-10-04 PROCEDURE — 74011000636 HC RX REV CODE- 636: Performed by: EMERGENCY MEDICINE

## 2021-10-04 PROCEDURE — 80053 COMPREHEN METABOLIC PANEL: CPT

## 2021-10-04 PROCEDURE — 74177 CT ABD & PELVIS W/CONTRAST: CPT

## 2021-10-04 PROCEDURE — 81001 URINALYSIS AUTO W/SCOPE: CPT

## 2021-10-04 PROCEDURE — C9113 INJ PANTOPRAZOLE SODIUM, VIA: HCPCS | Performed by: EMERGENCY MEDICINE

## 2021-10-04 RX ORDER — PANTOPRAZOLE SODIUM 40 MG/10ML
40 INJECTION, POWDER, LYOPHILIZED, FOR SOLUTION INTRAVENOUS
Status: COMPLETED | OUTPATIENT
Start: 2021-10-04 | End: 2021-10-04

## 2021-10-04 RX ADMIN — SODIUM CHLORIDE 500 ML: 9 INJECTION, SOLUTION INTRAVENOUS at 22:41

## 2021-10-04 RX ADMIN — IOPAMIDOL 100 ML: 755 INJECTION, SOLUTION INTRAVENOUS at 23:00

## 2021-10-04 RX ADMIN — PANTOPRAZOLE SODIUM 40 MG: 40 INJECTION, POWDER, FOR SOLUTION INTRAVENOUS at 22:41

## 2021-10-05 LAB — LACTATE BLD-SCNC: 1.89 MMOL/L (ref 0.4–2)

## 2021-10-05 PROCEDURE — 83605 ASSAY OF LACTIC ACID: CPT

## 2021-10-05 RX ORDER — ONDANSETRON 4 MG/1
4 TABLET, ORALLY DISINTEGRATING ORAL
Qty: 10 TABLET | Refills: 0 | Status: SHIPPED | OUTPATIENT
Start: 2021-10-05 | End: 2022-01-07

## 2021-10-05 RX ORDER — TRAMADOL HYDROCHLORIDE 50 MG/1
50 TABLET ORAL
Qty: 12 TABLET | Refills: 0 | Status: SHIPPED | OUTPATIENT
Start: 2021-10-05 | End: 2021-10-08

## 2021-10-05 RX ORDER — METRONIDAZOLE 500 MG/1
500 TABLET ORAL 3 TIMES DAILY
Qty: 21 TABLET | Refills: 0 | Status: SHIPPED | OUTPATIENT
Start: 2021-10-05 | End: 2021-10-12

## 2021-10-05 RX ORDER — CIPROFLOXACIN 500 MG/1
500 TABLET ORAL 2 TIMES DAILY
Qty: 14 TABLET | Refills: 0 | Status: SHIPPED | OUTPATIENT
Start: 2021-10-05 | End: 2021-10-12

## 2021-10-05 NOTE — ED NOTES
Cc abdominal pain to lower abdomen, started around 4pm today. Denies eating anything different.  Denies n/v/d.

## 2021-10-05 NOTE — ED PROVIDER NOTES
EMERGENCY DEPARTMENT HISTORY AND PHYSICAL EXAM      Date: 10/4/2021  Patient Name: Benigno Lamar    History of Presenting Illness     Chief Complaint   Patient presents with    Abdominal Pain     Patient has been having abdominal pain this evening and decided to stick his finger down his throat to make himself vomit. At that time he thought he saw bryan blood in the vomit. History Provided By: Patient    HPI: Benigno Lamar, 80 y.o. male with PMHx significant for bronchitis presents to the ED with chief complaint of abdominal pain that started about 4 PM and got progressively worse. Patient has some nausea and decided to [facility] myself throw up\". He repeatedly stuck his fingers down his throat causing him to vomit. After he vomited, he noticed that there was a small speck of blood in the emesis. He decided to call EMS because he was concerned about the bleeding. He reports his abdominal pain is 9 out of 10 and achy in nature. It is worse with movement. He ate garcia, eggs, and sausage at 8 AM this morning and then chicken and rice soup at 1:30 PM. He did not eat dinner tonight. Denies any black or bloody stools. Denies fevers, chills, chest pain, shortness of breath, dysuria, hematuria, urinary frequency. Reports that it is been more than 10 years since his last colonoscopy. PCP: Kortney Yuan MD    No current facility-administered medications on file prior to encounter. Current Outpatient Medications on File Prior to Encounter   Medication Sig Dispense Refill    albuterol (PROVENTIL VENTOLIN) 2.5 mg /3 mL (0.083 %) nebu 3 ML BY NEBULIZATION ROUTE EVERY FOUR (4) HOURS AS NEEDED FOR WHEEZING.  150 mL 11       Past History     Past Medical History:  Past Medical History:   Diagnosis Date    Asthmatic bronchitis     Back pain     Brachycephaly 11/2006    Cancer of prostate w/high recur risk (T3a or Darion 8-10 or PSA>20)     Colonoscopy refused 12/01/2016    not this year    Fall at home    Nuno Renteria Hard palate abscess     Lipoma of flank     Prostate CA ([facility] 2006    PSA elevation     S/P colonoscopy 8-11-06    Toothache        Past Surgical History:  No past surgical history on file. Family History:  No family history on file. Social History:  Social History     Tobacco Use    Smoking status: Never Smoker    Smokeless tobacco: Never Used   Substance Use Topics    Alcohol use: No    Drug use: No       Allergies:  No Known Allergies      Review of Systems   Review of Systems   Constitutional: Negative for chills and fever. HENT: Negative for congestion, ear pain, rhinorrhea and sore throat. Eyes: Negative for visual disturbance. Respiratory: Negative for cough, chest tightness, shortness of breath and wheezing. Cardiovascular: Negative for chest pain and palpitations. Gastrointestinal: Positive for abdominal pain, nausea and vomiting. Negative for blood in stool, constipation and diarrhea. Genitourinary: Negative for dysuria, flank pain and hematuria. Musculoskeletal: Negative for back pain, myalgias and neck pain. Skin: Negative for rash and wound. Neurological: Negative for dizziness, syncope, light-headedness and headaches. Psychiatric/Behavioral: Negative for confusion. The patient is nervous/anxious. All other systems reviewed and are negative.         Physical Exam   General appearance -elderly, well nourished, well appearing, and in no distress  Eyes - pupils equal and reactive, extraocular eye movements intact  ENT - mucous membranes moist, pharynx normal without lesions  Neck - supple, no significant adenopathy; non-tender to palpation  Chest - clear to auscultation, no wheezes, rales or rhonchi; non-tender to palpation  Heart - normal rate and regular rhythm, S1 and S2 normal, no murmurs noted  Abdomen - soft, mild generalized abdominal tenderness, no rebound or guarding, decreased bowel sounds throughout, nondistended, no masses or organomegaly  Musculoskeletal - no joint tenderness, deformity or swelling; normal ROM  Extremities - peripheral pulses normal, no pedal edema  Skin - normal coloration and turgor, no rashes  Neurological - alert, oriented x3, normal speech, no focal findings or movement disorder noted    Diagnostic Study Results     Labs -     Recent Results (from the past 12 hour(s))   CBC W/O DIFF    Collection Time: 10/04/21  9:08 PM   Result Value Ref Range    WBC 10.7 4.1 - 11.1 K/uL    RBC 4.08 (L) 4.10 - 5.70 M/uL    HGB 14.1 12.1 - 17.0 g/dL    HCT 42.5 36.6 - 50.3 %    .2 (H) 80.0 - 99.0 FL    MCH 34.6 (H) 26.0 - 34.0 PG    MCHC 33.2 30.0 - 36.5 g/dL    RDW 11.7 11.5 - 14.5 %    PLATELET 811 713 - 488 K/uL    MPV 9.3 8.9 - 12.9 FL    NRBC 0.0 0  WBC    ABSOLUTE NRBC 0.00 0.00 - 7.76 K/uL   METABOLIC PANEL, COMPREHENSIVE    Collection Time: 10/04/21  9:08 PM   Result Value Ref Range    Sodium 135 (L) 136 - 145 mmol/L    Potassium SPECIMEN HEMOLYZED, RESULTS MAY BE AFFECTED 3.5 - 5.1 mmol/L    Chloride 106 97 - 108 mmol/L    CO2 28 21 - 32 mmol/L    Anion gap 1 (L) 5 - 15 mmol/L    Glucose 156 (H) 65 - 100 mg/dL    BUN 17 6 - 20 MG/DL    Creatinine 1.22 0.70 - 1.30 MG/DL    BUN/Creatinine ratio 14 12 - 20      GFR est AA >60 >60 ml/min/1.73m2    GFR est non-AA 57 (L) >60 ml/min/1.73m2    Calcium 9.1 8.5 - 10.1 MG/DL    Bilirubin, total 1.1 (H) 0.2 - 1.0 MG/DL    ALT (SGPT) 18 12 - 78 U/L    AST (SGOT) 35 15 - 37 U/L    Alk.  phosphatase 74 45 - 117 U/L    Protein, total 7.9 6.4 - 8.2 g/dL    Albumin 3.6 3.5 - 5.0 g/dL    Globulin 4.3 (H) 2.0 - 4.0 g/dL    A-G Ratio 0.8 (L) 1.1 - 2.2     LIPASE    Collection Time: 10/04/21  9:08 PM   Result Value Ref Range    Lipase 68 (L) 73 - 393 U/L   BLOOD GAS,CHEM8,LACTIC ACID POC    Collection Time: 10/04/21 10:40 PM   Result Value Ref Range    Calcium, ionized (POC) 1.21 1.12 - 1.32 mmol/L    BICARBONATE 34 mmol/L    Base excess (POC) 5.4 mmol/L    Sample source VENOUS BLOOD      CO2, POC 34 (H) 19 - 24 MMOL/L    Sodium,  (H) 136 - 145 MMOL/L    Potassium, POC 4.3 3.5 - 5.5 MMOL/L    Chloride,  100 - 108 MMOL/L    Glucose,  (H) 74 - 106 MG/DL    Creatinine, POC 1.1 0.6 - 1.3 MG/DL    Lactic Acid (POC) 2.58 (HH) 0.40 - 2.00 mmol/L    Critical value read back KADEN DURBIN     pH, venous (POC) 7.34 7.32 - 7.42      pCO2, venous (POC) 63.4 (H) 41 - 51 MMHG    pO2, venous (POC) 21 (L) 25 - 40 mmHg       Radiologic Studies -   CT ABD PELV W CONT   Final Result      1. Infectious versus inflammatory enteritis. No bowel obstruction. 2. Prostatomegaly versus urinary bladder mass. Correlate with previous urinary   bladder imaging. 3. Nonobstructing small left inguinal hernia. CT Results  (Last 48 hours)    None        CXR Results  (Last 48 hours)    None            Medical Decision Making   I am the first provider for this patient. I reviewed the vital signs, available nursing notes, past medical history, past surgical history, family history and social history. Vital Signs-Reviewed the patient's vital signs. Patient Vitals for the past 12 hrs:   Temp Pulse Resp BP SpO2   10/04/21 2041 97.7 °F (36.5 °C) 87 16 (!) 143/79 100 %           Records Reviewed: Nursing Notes and Old Medical Records    Provider Notes (Medical Decision Making):   Differential diagnosis: Gastritis, pancreatitis, UTI, bowel obstruction, diverticulitis, colitis, dehydration  We will check CBC, CMP, UA, abdominal pelvis CT, lactate. ED Course:   Initial assessment performed. The patients presenting problems have been discussed, and they are in agreement with the care plan formulated and outlined with them. I have encouraged them to ask questions as they arise throughout their visit. Progress Notes:  ED Course as of Oct 07 0924   Tue Oct 05, 2021   0207 CT shows enteritis that is either infectious versus inflammatory. Will treat with Cipro and Flagyl along with Zofran and tramadol for her symptoms.   Discussed with patient that the CT also showed either prostatomegaly versus a bladder mass and encouraged him to follow-up with urology. Lactate initially was greater than 2, however on recheck it was 1.89. Patient is feeling better and has had no more episodes of vomiting in the ED. Ready for discharge.    [AO]      ED Course User Index  [AO] Lelia Pham MD       Disposition:  Dc home    PLAN:  1. Discharge Medication List as of 10/5/2021  2:06 AM      START taking these medications    Details   ciprofloxacin HCl (Cipro) 500 mg tablet Take 1 Tablet by mouth two (2) times a day for 7 days. , Normal, Disp-14 Tablet, R-0      metroNIDAZOLE (FlagyL) 500 mg tablet Take 1 Tablet by mouth three (3) times daily for 7 days. , Normal, Disp-21 Tablet, R-0      ondansetron (Zofran ODT) 4 mg disintegrating tablet Take 1 Tablet by mouth every eight (8) hours as needed for Nausea., Normal, Disp-10 Tablet, R-0      traMADoL (Ultram) 50 mg tablet Take 1 Tablet by mouth every six (6) hours as needed for Pain for up to 3 days. Max Daily Amount: 200 mg., Normal, Disp-12 Tablet, R-0         CONTINUE these medications which have NOT CHANGED    Details   albuterol (PROVENTIL VENTOLIN) 2.5 mg /3 mL (0.083 %) nebu 3 ML BY NEBULIZATION ROUTE EVERY FOUR (4) HOURS AS NEEDED FOR WHEEZING., Normal, Disp-150 mL, R-11           2.    Follow-up Information     Follow up With Specialties Details Why Contact Info    Bruno Mullen MD Internal Medicine Schedule an appointment as soon as possible for a visit   [id number] [address]  [facility] 7 [phone]      [address] [place] [address] EMERGENCY DEPT Emergency Medicine  If symptoms worsen 200 [facility] 31    Jeffrey Nunez MD Gastroenterology  As needed [address] [place] [address] 130 [facility]      Marisa Wisdom MD Urology Schedule an appointment as soon as possible for a visit  regarding prostate abnormality or bladder mass seen on CT [address]  [phone]          Return to ED if worse     Diagnosis     Clinical Impression:   1. Enteritis    2.  Abnormal CT of the abdomen

## 2021-10-07 ENCOUNTER — OFFICE VISIT (OUTPATIENT)
Dept: INTERNAL MEDICINE CLINIC | Age: 81
End: 2021-10-07
Payer: MEDICARE

## 2021-10-07 VITALS
OXYGEN SATURATION: 96 % | BODY MASS INDEX: 24.26 KG/M2 | TEMPERATURE: 98.1 F | DIASTOLIC BLOOD PRESSURE: 88 MMHG | RESPIRATION RATE: 18 BRPM | HEART RATE: 81 BPM | WEIGHT: 142.1 LBS | HEIGHT: 64 IN | SYSTOLIC BLOOD PRESSURE: 139 MMHG

## 2021-10-07 DIAGNOSIS — Z85.46 H/O PROSTATE CANCER: ICD-10-CM

## 2021-10-07 DIAGNOSIS — K52.9 ENTERITIS: Primary | ICD-10-CM

## 2021-10-07 DIAGNOSIS — K40.90 INGUINAL HERNIA OF LEFT SIDE WITHOUT OBSTRUCTION OR GANGRENE: ICD-10-CM

## 2021-10-07 PROCEDURE — G8510 SCR DEP NEG, NO PLAN REQD: HCPCS | Performed by: INTERNAL MEDICINE

## 2021-10-07 PROCEDURE — G8420 CALC BMI NORM PARAMETERS: HCPCS | Performed by: INTERNAL MEDICINE

## 2021-10-07 PROCEDURE — 1101F PT FALLS ASSESS-DOCD LE1/YR: CPT | Performed by: INTERNAL MEDICINE

## 2021-10-07 PROCEDURE — G8536 NO DOC ELDER MAL SCRN: HCPCS | Performed by: INTERNAL MEDICINE

## 2021-10-07 PROCEDURE — 99214 OFFICE O/P EST MOD 30 MIN: CPT | Performed by: INTERNAL MEDICINE

## 2021-10-07 PROCEDURE — G8427 DOCREV CUR MEDS BY ELIG CLIN: HCPCS | Performed by: INTERNAL MEDICINE

## 2021-10-07 NOTE — PROGRESS NOTES
SPORTS MEDICINE AND PRIMARY CARE  Yenifer Sherman MD, [address] [address]  Phone:  [phone]  Fax: [phone]       Chief Complaint   Patient presents with   [facility] ED Follow-up   . SUBJECTIVE:    Rayray Ryan is a 80 y.o. male Patient returns today following evaluation in the emergency room by Lelia Michel MD on 10/04 complaining of abdominal pain and tried to stick his finger down his throat to make himself vomit at that time and he saw some blood in his vomitus. He had a CT scan that showed enteritis that was either infectious versus inflammatory. He was treated with Ciprofloxacin and Flagyl and given Zofran and Tramadol for his pain. They discussed the CT scan findings with the patient and he was noted to have a possible bladder mass and he was encouraged to follow up with urologist.  Initially the lactate was greater than 2 and on recheck it was 1.89. He was feeling better with no more episodes of vomiting in the emergency room and therefore he was subsequently discharged. Other medical problems include prostate cancer. Since we last saw him, he had a artaculous Transportation Physical and ear wax was removed. Later the right ear had drainage. He followed up with paperwork from Dr. Alla Aguila. The ear drainage on the right stopped and he was told to flush once a week. He had some stomach pains, self-induced nausea and vomiting. He went to the emergency room, which we described above, and he is going to see his urologist, Dandre Cardoso 9, for follow up of the bladder mass. Unlikely to be enlarged prostate since he has a history of prostate cancer and he had seed implant for prostate cancer. The abdominal pain has gone away; in fact, it went away that night at the hospital.  He has had no vomiting, has no desire to induce vomiting, and is seen for evaluation.            Current Outpatient Medications   Medication Sig Dispense Refill    ciprofloxacin HCl (Cipro) 500 mg tablet Take 1 Tablet by mouth two (2) times a day for 7 days. 14 Tablet 0    metroNIDAZOLE (FlagyL) 500 mg tablet Take 1 Tablet by mouth three (3) times daily for 7 days. 21 Tablet 0    albuterol (PROVENTIL VENTOLIN) 2.5 mg /3 mL (0.083 %) nebu 3 ML BY NEBULIZATION ROUTE EVERY FOUR (4) HOURS AS NEEDED FOR WHEEZING. 150 mL 11    ondansetron (Zofran ODT) 4 mg disintegrating tablet Take 1 Tablet by mouth every eight (8) hours as needed for Nausea. (Patient not taking: Reported on 10/7/2021) 10 Tablet 0    traMADoL (Ultram) 50 mg tablet Take 1 Tablet by mouth every six (6) hours as needed for Pain for up to 3 days. Max Daily Amount: 200 mg. 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 No pertinent surgical history.   No Known Allergies      REVIEW OF SYSTEMS:  General: negative for - chills or fever  ENT: negative for - headaches, nasal congestion or tinnitus  Respiratory: negative for - cough, hemoptysis, shortness of breath or wheezing  Cardiovascular : negative for - chest pain, edema, palpitations or shortness of breath  Gastrointestinal: negative for - abdominal pain, blood in stools, heartburn or nausea/vomiting  Genito-Urinary: no dysuria, trouble voiding, or hematuria  Musculoskeletal: negative for - gait disturbance, joint pain, joint stiffness or joint swelling  Neurological: no TIA or stroke symptoms  Hematologic: no bruises, no bleeding, no swollen glands  Integument: no lumps, mole changes, nail changes or rash  Endocrine: no malaise/lethargy or unexpected weight changes      Social History     Socioeconomic History    Marital status: SINGLE     Spouse name: Not on file    Number of children: Not on file    Years of education: Not on file    Highest education level: Not on file   Tobacco Use    Smoking status: Never Smoker    Smokeless tobacco: Never Used   Vaping Use    Vaping Use: Never used   Substance and Sexual Activity    Alcohol use: No    Drug use: No    Sexual activity: Yes     Partners: Female   Social History Narrative    Family History: Mother: , natural causesFather: , natural causesSister(s): aliveBrother(s): aliveDaughter(s):    aliveGrandmother: alive8 brother(s) , 5 sister(s) - healthy. 1daughter(s) - healthy. Social History: Alcohol Use Patient does not use alcohol. Smoking Status Patient is a former smoker, Quit in: 40 years ago. Caffeine: none,    coffee, per day, soda diet, occasional, tea. Marital Status: Single. Lives w ith: alone w ith grandson. Children: yes, daughters. Occupation/W ork:    retired. Social Determinants of Health     Financial Resource Strain:     Difficulty of Paying Living Expenses:    Food Insecurity:     Worried About Running Out of Food in the Last Year:     [address] in the Last Year:    Transportation Needs:     Lack of Transportation (Medical):  Lack of Transportation (Non-Medical):    Physical Activity:     Days of Exercise per Week:     Minutes of Exercise per Session:    Stress:     Feeling of Stress :    Social Connections:     Frequency of Communication with Friends and Family:     Frequency of Social Gatherings with Friends and Family:     Attends Confucianist Services:     Active Member of Clubs or Organizations:     Attends Club or Organization Meetings:     Marital Status:      History reviewed. No pertinent family history.     OBJECTIVE:    Visit Vitals  /88   Pulse 81   Temp 98.1 °F (36.7 °C) (Oral)   Resp 18   Ht 5' 4\" (1.626 m)   Wt 142 lb 1.6 oz (64.5 kg)   SpO2 96%   BMI 24.39 kg/m²     CONSTITUTIONAL: well , well nourished, appears age appropriate  EYES: perrla, eom intact  ENMT:moist mucous membranes, pharynx clear  NECK: supple. Thyroid normal  RESPIRATORY: Chest: clear bilaterally   CARDIOVASCULAR: Heart: regular rate and rhythm  GASTROINTESTINAL: Abdomen: soft, bowel sounds active  HEMATOLOGIC: no pathological lymph nodes palpated  MUSCULOSKELETAL: Extremities: no edema, pulse 1+   INTEGUMENT: No unusual rashes or suspicious skin lesions noted. Nails appear normal.  NEUROLOGIC: non-focal exam   MENTAL STATUS: alert and oriented, appropriate affect           ASSESSMENT:  1. Enteritis    2. H/O prostate cancer    3. Inguinal hernia of left side without obstruction or gangrene      The signs and symptoms of enteritis have resolved. His abdomen is completely benign on exam, suggesting it may not have been bacterial in origin. He has had a seed implant for his prostate cancer, the seeds are seen on CT scan. I am concerned about the findings on the CT and he is going to call Dr. Carla Hadley himself and make the appointment. We give him the referral.    There is a left sided inguinal hernia that is very small, has no evidence of obstruction, and he is asymptomatic and therefore no recommendations for procedure are made. We cautioned him not to strain and not to do any heavy lifting. He will be back to see me in three to four months, sooner if any problems. I have discussed the diagnosis with the patient and the intended plan as seen in the  Orders. The patient understands and agees with the plan. The patient has   received an after visit summary and questions were answered concerning  future plans  Patient labs and/or xrays were reviewed  Past records were reviewed.     PLAN:  .  Orders Placed This [address] [place] [address] Urology ED [facility]       Follow-up and Dispositions · Return in about 3 months (around 1/7/2022). Follow-up and Disposition History                 ATTENTION:   This medical record was transcribed using an electronic medical records system. Although proofread, it may and can contain electronic and spelling errors. Other human spelling and other errors may be present. Corrections may be executed at a later time. Please feel free to contact us for any clarifications as needed.

## 2021-10-07 NOTE — PROGRESS NOTES
1. Have you been to the ER, urgent care clinic since your last visit? Hospitalized since your last visit? Yes When: 10-4-21 Where: [facility] Reason for visit: abdominal pain    2. Have you seen or consulted any other health care providers outside of the [address] [place] [address] since your last visit? Include any pap smears or colon screening.  No     Wants to discuss ear issue  ED follow up  Patient has a list

## 2021-12-01 RX ORDER — ALBUTEROL SULFATE 0.83 MG/ML
2.5 SOLUTION RESPIRATORY (INHALATION)
Qty: 150 ML | Refills: 11 | Status: SHIPPED | OUTPATIENT
Start: 2021-12-01

## 2022-01-07 ENCOUNTER — OFFICE VISIT (OUTPATIENT)
Dept: INTERNAL MEDICINE CLINIC | Age: 82
End: 2022-01-07
Payer: MEDICARE

## 2022-01-07 VITALS
DIASTOLIC BLOOD PRESSURE: 91 MMHG | WEIGHT: 141.4 LBS | BODY MASS INDEX: 24.14 KG/M2 | HEIGHT: 64 IN | RESPIRATION RATE: 16 BRPM | HEART RATE: 91 BPM | SYSTOLIC BLOOD PRESSURE: 138 MMHG | TEMPERATURE: 98 F

## 2022-01-07 DIAGNOSIS — C61 PROSTATE CA (HCC): ICD-10-CM

## 2022-01-07 DIAGNOSIS — J45.41 MODERATE PERSISTENT ASTHMATIC BRONCHITIS WITH ACUTE EXACERBATION: Primary | ICD-10-CM

## 2022-01-07 DIAGNOSIS — K40.90 INGUINAL HERNIA OF LEFT SIDE WITHOUT OBSTRUCTION OR GANGRENE: ICD-10-CM

## 2022-01-07 PROCEDURE — 1101F PT FALLS ASSESS-DOCD LE1/YR: CPT | Performed by: INTERNAL MEDICINE

## 2022-01-07 PROCEDURE — 99214 OFFICE O/P EST MOD 30 MIN: CPT | Performed by: INTERNAL MEDICINE

## 2022-01-07 PROCEDURE — G8510 SCR DEP NEG, NO PLAN REQD: HCPCS | Performed by: INTERNAL MEDICINE

## 2022-01-07 PROCEDURE — G8420 CALC BMI NORM PARAMETERS: HCPCS | Performed by: INTERNAL MEDICINE

## 2022-01-07 PROCEDURE — G8427 DOCREV CUR MEDS BY ELIG CLIN: HCPCS | Performed by: INTERNAL MEDICINE

## 2022-01-07 PROCEDURE — G8536 NO DOC ELDER MAL SCRN: HCPCS | Performed by: INTERNAL MEDICINE

## 2022-01-07 NOTE — PROGRESS NOTES
SPORTS MEDICINE AND PRIMARY CARE  Alyssa Panda MD, [address] [address]  Phone:  [phone]  Fax: [phone]       Chief Complaint   Patient presents with    Other     Paatient in office with complaints of headache and sore throat. .      SUBJECTIVE:    Roberto Cruz is a [de-identified] y.o. male Patient returns today with his son, Saran Tang, and states that since we last saw him, Sunday he was taking pictures with his [employer] and subsequently he developed headache, sore throat and took Claritin-D, Robitussin DM, Tylenol. He had a fever on Wednesday. We congratulate him on his 82th birthday yesterday. He comes in stating he feels better today. He did not take anything this morning. He has a known history of asthmatic bronchitis, prostate cancer, left inguinal hernia, and is seen for evaluation. Current Outpatient Medications   Medication Sig Dispense Refill    albuterol (PROVENTIL VENTOLIN) 2.5 mg /3 mL (0.083 %) nebu 3 ML BY NEBULIZATION ROUTE EVERY FOUR (4) HOURS AS NEEDED FOR WHEEZING. 150 mL 11    ondansetron (Zofran ODT) 4 mg disintegrating tablet Take 1 Tablet by mouth every eight (8) hours as needed for Nausea. 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 No pertinent surgical history.   No Known Allergies      REVIEW OF SYSTEMS:  General: negative for - chills or fever  ENT: negative for - headaches, nasal congestion or tinnitus  Respiratory: negative for - cough, hemoptysis, shortness of breath or wheezing  Cardiovascular : negative for - chest pain, edema, palpitations or shortness of breath  Gastrointestinal: negative for - abdominal pain, blood in stools, heartburn or nausea/vomiting  Genito-Urinary: no dysuria, trouble voiding, or hematuria  Musculoskeletal: negative for - gait disturbance, joint pain, joint stiffness or joint swelling  Neurological: no TIA or stroke symptoms  Hematologic: no bruises, no bleeding, no swollen glands  Integument: no lumps, mole changes, nail changes or rash  Endocrine: no malaise/lethargy or unexpected weight changes      Social History     Socioeconomic History    Marital status: SINGLE   Tobacco Use    Smoking status: Never Smoker    Smokeless tobacco: Never Used   Vaping Use    Vaping Use: Never used   Substance and Sexual Activity    Alcohol use: No    Drug use: No    Sexual activity: Yes     Partners: Female   Social History Narrative    Family History: Mother: , natural causesFather: , natural causesSister(s): aliveBrother(s): aliveDaughter(s):aliveGrandmother: Allen Gao brother(s) , 5 sister(s) - healthy. 1daughter(s) - healthy. Social History: Alcohol Use Patient does not use alcohol. Smoking Status Patient is a former smoker, Quit in: 40 years ago. Caffeine: none,coffee, per day, soda diet, occasional, tea. Marital Status: Single. Lives w ith: alone w ith grandson. Children: yes, daughters. Occupation/W ork:    retired. History reviewed. No pertinent family history. OBJECTIVE:    Visit Vitals  BP (!) 138/91   Pulse 91   Temp 98 °F (36.7 °C) (Oral)   Resp 16   Ht 5' 4\" (1.626 m)   Wt 141 lb 6.4 oz (64.1 kg)   BMI 24.27 kg/m²     CONSTITUTIONAL: well , well nourished, appears age appropriate  EYES: perrla, eom intact  ENMT:moist mucous membranes, pharynx clear  NECK: supple.  Thyroid normal  RESPIRATORY: Chest: clear bilaterally   CARDIOVASCULAR: Heart: regular rate and rhythm  GASTROINTESTINAL: Abdomen: soft, bowel sounds active  HEMATOLOGIC: no pathological lymph nodes palpated  MUSCULOSKELETAL: Extremities: no edema, pulse 1+   INTEGUMENT: No unusual rashes or suspicious skin lesions noted. Nails appear normal.  NEUROLOGIC: non-focal exam   MENTAL STATUS: alert and oriented, appropriate affect           ASSESSMENT:  1. Moderate persistent asthmatic bronchitis with acute exacerbation    2. Prostate CA ([address]    3. Inguinal hernia of left side without obstruction or gangrene      Patient fortunately got over this URI. Whether it was COVID or not we cannot say. He has had two of his shots and is going to get his booster. He may have had a slight bout of COVID, but it sounded more like a common cold or URI. He is over it now, so no further evaluation will be undertaken. Prostate cancer remains in remission. On his next visit we will check his PSA. He has a left inguinal hernia without symptoms. He is doing well. At [de-identified]years old we congratulate him, he is doing well. Reminded to get his shingles shot and pneumonia shot and complete his booster. He will be back to see me in about four months, sooner if he has any problems. I have discussed the diagnosis with the patient and the intended plan as seen in the  Orders. The patient understands and agees with the plan. The patient has   received an after visit summary and questions were answered concerning  future plans  Patient labs and/or xrays were reviewed  Past records were reviewed. PLAN:  . No orders of the defined types were placed in this encounter. Follow-up and Dispositions    · Return in about 4 months (around 5/7/2022). ATTENTION:   This medical record was transcribed using an electronic medical records system. Although proofread, it may and can contain electronic and spelling errors. Other human spelling and other errors may be present. Corrections may be executed at a later time. Please feel free to contact us for any clarifications as needed.

## 2022-01-07 NOTE — PROGRESS NOTES
Chief Complaint   Patient presents with    Other     Paatient in office with complaints of headache and sore throat. 1. Have you been to the ER, urgent care clinic since your last visit? Hospitalized since your last visit? No    2. Have you seen or consulted any other health care providers outside of the [address] [place] [address] since your last visit? Include any pap smears or colon screening.  No

## 2022-02-07 PROBLEM — H49.22 SIXTH NERVE PALSY OF LEFT EYE: Status: ACTIVE | Noted: 2022-02-04

## 2022-02-22 ENCOUNTER — OFFICE VISIT (OUTPATIENT)
Dept: INTERNAL MEDICINE CLINIC | Age: 82
End: 2022-02-22
Payer: MEDICARE

## 2022-02-22 VITALS
RESPIRATION RATE: 18 BRPM | BODY MASS INDEX: 24.04 KG/M2 | HEART RATE: 111 BPM | WEIGHT: 140.8 LBS | SYSTOLIC BLOOD PRESSURE: 111 MMHG | DIASTOLIC BLOOD PRESSURE: 77 MMHG | OXYGEN SATURATION: 96 % | HEIGHT: 64 IN | TEMPERATURE: 98.2 F

## 2022-02-22 DIAGNOSIS — R79.9 ABNORMAL FINDING OF BLOOD CHEMISTRY, UNSPECIFIED: ICD-10-CM

## 2022-02-22 DIAGNOSIS — E78.5 DYSLIPIDEMIA: ICD-10-CM

## 2022-02-22 DIAGNOSIS — K40.90 INGUINAL HERNIA OF LEFT SIDE WITHOUT OBSTRUCTION OR GANGRENE: ICD-10-CM

## 2022-02-22 DIAGNOSIS — D17.1 LIPOMA OF FLANK: ICD-10-CM

## 2022-02-22 DIAGNOSIS — G45.8 OTHER SPECIFIED TRANSIENT CEREBRAL ISCHEMIAS: ICD-10-CM

## 2022-02-22 DIAGNOSIS — R20.0 NUMBNESS: ICD-10-CM

## 2022-02-22 DIAGNOSIS — H49.22 SIXTH NERVE PALSY OF LEFT EYE: Primary | ICD-10-CM

## 2022-02-22 DIAGNOSIS — E78.49 OTHER HYPERLIPIDEMIA: ICD-10-CM

## 2022-02-22 DIAGNOSIS — C61 PROSTATE CA (HCC): ICD-10-CM

## 2022-02-22 PROCEDURE — 1101F PT FALLS ASSESS-DOCD LE1/YR: CPT | Performed by: INTERNAL MEDICINE

## 2022-02-22 PROCEDURE — G8536 NO DOC ELDER MAL SCRN: HCPCS | Performed by: INTERNAL MEDICINE

## 2022-02-22 PROCEDURE — G8427 DOCREV CUR MEDS BY ELIG CLIN: HCPCS | Performed by: INTERNAL MEDICINE

## 2022-02-22 PROCEDURE — 99214 OFFICE O/P EST MOD 30 MIN: CPT | Performed by: INTERNAL MEDICINE

## 2022-02-22 PROCEDURE — G8510 SCR DEP NEG, NO PLAN REQD: HCPCS | Performed by: INTERNAL MEDICINE

## 2022-02-22 PROCEDURE — G8420 CALC BMI NORM PARAMETERS: HCPCS | Performed by: INTERNAL MEDICINE

## 2022-02-22 RX ORDER — ASPIRIN 81 MG/1
81 TABLET ORAL DAILY
Qty: 90 TABLET | Refills: 3 | Status: SHIPPED | OUTPATIENT
Start: 2022-02-22

## 2022-02-22 RX ORDER — ROSUVASTATIN CALCIUM 5 MG/1
5 TABLET, COATED ORAL
Qty: 30 TABLET | Refills: 11 | Status: SHIPPED | OUTPATIENT
Start: 2022-02-22 | End: 2022-02-23

## 2022-02-22 NOTE — PROGRESS NOTES
SPORTS MEDICINE AND PRIMARY CARE  Sera Soto MD, [address] [address]  Phone:  [phone]  Fax: [phone]       Chief Complaint   Patient presents with    Tingling     hands and face    . SUBJECTIVE:    Joyce Babin is a [de-identified] y.o. 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 In most cases this resolves spontaneously within three months. He decided to hold off on neuroimaging at that time. If he failed to improve, or worsened over the next few weeks, he is going to consider neuroimaging and referral to his PCP to evaluate cardiovascular risk factors and optimize. Other medical problems include prostate cancer, lipoma of the flank, and is seen for evaluation. Patient states he thinks his double vision is getting better. He has been using his glasses all the time, prism for correction. He wanted his blood pressure checked, his cholesterol checked. He complains of numbness and tingling of his finger and numbness of his face. He states he starts feeling numbness in his thumb and then feels it in his second and third fingers, and then he feels it in the left side of his faced, more in the jaw area, and it always goes in that sequence, and it lasts for about a minute or so and then spontaneously goes away. He has had it in the past, but did not pay any attention to it. He had it two or three times over the past three days, but today he has not had it at all. He notices it more when using his left hand, usually he starts to feel it in his thumb. He wants a check of his blood pressure and cholesterol. He has finger numbness or tingling that starts in the thumb and goes to his index finger and middle finger on the left hand, and then goes to the left side of his face, more in the jaw area. It lasts for a minute or so and then subsides.  He has had two or three episodes over the past two or three days, but has not had any today. He has an appointment to see Tabby Gutierres MD on 03/04/22 for follow up. Patient is seen for evaluation. Current Outpatient Medications   Medication Sig Dispense Refill    rosuvastatin (CRESTOR) 5 mg tablet Take 1 Tablet by mouth nightly. 30 Tablet 11    aspirin delayed-release 81 mg tablet Take 1 Tablet by mouth daily. 90 Tablet 3    albuterol (PROVENTIL VENTOLIN) 2.5 mg /3 mL (0.083 %) nebu 3 ML BY NEBULIZATION ROUTE EVERY FOUR (4) HOURS AS NEEDED FOR WHEEZING. 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 Most cases resolve spontaneously within 3 months. We will hold off on neuroimaging at this time. If fails to improve or worsens over the next few weeks will consider neuroimaging refer to PCP to evaluate cardiovascular risk factors and optimize    Toothache      No past surgical history on file.   No Known Allergies      REVIEW OF SYSTEMS:  General: negative for - chills or fever  ENT: negative for - headaches, nasal congestion or tinnitus  Respiratory: negative for - cough, hemoptysis, shortness of breath or wheezing  Cardiovascular : negative for - chest pain, edema, palpitations or shortness of breath  Gastrointestinal: negative for - abdominal pain, blood in stools, heartburn or nausea/vomiting  Genito-Urinary: no dysuria, trouble voiding, or hematuria  Musculoskeletal: negative for - gait disturbance, joint pain, joint stiffness or joint swelling  Neurological: no TIA or stroke symptoms  Hematologic: no bruises, no bleeding, no swollen glands  Integument: no lumps, mole changes, nail changes or rash  Endocrine: no malaise/lethargy or unexpected weight changes      Social History     Socioeconomic History    Marital status: SINGLE   Tobacco Use    Smoking status: Never Smoker    Smokeless tobacco: Never Used   Vaping Use    Vaping Use: Never used   Substance and Sexual Activity    Alcohol use: No    Drug use: No    Sexual activity: Yes     Partners: Female   Social History Narrative    Family History: Mother: , natural causesFather: , natural causesSister(s): aliveBrother(s): aliveDaughter(s):aliveGrandmother: Mine Sidhu brother(s) , 5 sister(s) - healthy. 1daughter(s) - healthy. Social History: Alcohol Use Patient does not use alcohol. Smoking Status Patient is a former smoker, Quit in: 40 years ago. Caffeine: none,coffee, per day, soda diet, occasional, tea. Marital Status: Single. Lives w ith: alone w ith grandson. Children: yes, daughters. Occupation/W ork:    retired. No family history on file. OBJECTIVE:    Visit Vitals  /77 (BP 1 Location: Left upper arm, BP Patient Position: Sitting)   Pulse (!) 111   Temp 98.2 °F (36.8 °C) (Oral)   Resp 18   Ht 5' 4\" (1.626 m)   Wt 140 lb 12.8 oz (63.9 kg)   SpO2 96%   BMI 24.17 kg/m²     CONSTITUTIONAL: well , well nourished, appears age appropriate  EYES: perrla, eom intact  ENMT:moist mucous membranes, pharynx clear  NECK: supple. Thyroid normal  RESPIRATORY: Chest: clear bilaterally   CARDIOVASCULAR: Heart: regular rate and rhythm  GASTROINTESTINAL: Abdomen: soft, bowel sounds active  HEMATOLOGIC: no pathological lymph nodes palpated  MUSCULOSKELETAL: Extremities: no edema, pulse 1+   INTEGUMENT: No unusual rashes or suspicious skin lesions noted. Nails appear normal.  NEUROLOGIC: non-focal exam   MENTAL STATUS: alert and oriented, appropriate affect           ASSESSMENT:  1. Sixth nerve palsy of left eye    2. Prostate CA ([address]    3. Inguinal hernia of left side without obstruction or gangrene    4. Lipoma of flank    5. Other specified transient cerebral ischemias     6. Dyslipidemia    7. Abnormal finding of blood chemistry, unspecified     8. Other hyperlipidemia     9. Numbness      The sixth nerve palsy is at least stable, if not doing better. I am concerned about the palsy and in addition now to the vague symptoms of numbness on the left side, and we will treat it as if it is a TIA and evaluate it with an MRI, carotid dopplers and echocardiogram.  Patient agrees with the plan. To that end, I think we should start him on an 81 mg aspirin daily until we complete our evaluation. History of prostate cancer, in remission. Left inguinal hernia is stable. Lipoma of the flank is not changed significantly. He has mild dyslipidemia and we will add Rosuvastatin at a small dose to bring his LDL down to goal of 70 or less. He will be back to see me in about a month, sooner if he has any problems. I have discussed the diagnosis with the patient and the intended plan as seen in the  Orders. The patient understands and agees with the plan. The patient has   received an after visit summary and questions were answered concerning  future plans  Patient labs and/or xrays were reviewed  Past records were reviewed. PLAN:  .  Orders Placed This Encounter    MRI BRAIN W WO CONT    GAMMOPATHY EVAL, SPEP/NATASHA, IG QT/FLC    CBC WITH AUTOMATED DIFF    RENAL FUNCTION PANEL    LIPID PANEL    APOLIPOPROTEIN B    rosuvastatin (CRESTOR) 5 mg tablet    aspirin delayed-release 81 mg tablet       Follow-up and Dispositions    · Return in about 4 weeks (around 3/22/2022).                 ATTENTION:   This medical record was transcribed using an electronic medical records system. Although proofread, it may and can contain electronic and spelling errors. Other human spelling and other errors may be present. Corrections may be executed at a later time. Please feel free to contact us for any clarifications as needed.

## 2022-02-22 NOTE — PROGRESS NOTES
Arlet Prado is a [de-identified] y.o. male    Chief Complaint   Patient presents with    Tingling     hands and face      1. Have you been to the ER, urgent care clinic since your last visit? Hospitalized since your last visit? No      2. Have you seen or consulted any other health care providers outside of the [address] [place] [address] since your last visit? Include any pap smears or colon screening.  No

## 2022-02-23 LAB
ALBUMIN SERPL-MCNC: 3.5 G/DL (ref 3.5–5)
ANION GAP SERPL CALC-SCNC: 5 MMOL/L (ref 5–15)
BASOPHILS # BLD: 0.1 K/UL (ref 0–0.1)
BASOPHILS NFR BLD: 2 % (ref 0–1)
BUN SERPL-MCNC: 18 MG/DL (ref 6–20)
BUN/CREAT SERPL: 15 (ref 12–20)
CALCIUM SERPL-MCNC: 9.3 MG/DL (ref 8.5–10.1)
CHLORIDE SERPL-SCNC: 110 MMOL/L (ref 97–108)
CHOLEST SERPL-MCNC: 240 MG/DL
CO2 SERPL-SCNC: 28 MMOL/L (ref 21–32)
CREAT SERPL-MCNC: 1.19 MG/DL (ref 0.7–1.3)
DIFFERENTIAL METHOD BLD: ABNORMAL
EOSINOPHIL # BLD: 0.1 K/UL (ref 0–0.4)
EOSINOPHIL NFR BLD: 2 % (ref 0–7)
ERYTHROCYTE [DISTWIDTH] IN BLOOD BY AUTOMATED COUNT: 11.6 % (ref 11.5–14.5)
GLUCOSE SERPL-MCNC: 86 MG/DL (ref 65–100)
HCT VFR BLD AUTO: 45.5 % (ref 36.6–50.3)
HDLC SERPL-MCNC: 70 MG/DL
HDLC SERPL: 3.4 {RATIO} (ref 0–5)
HGB BLD-MCNC: 14.2 G/DL (ref 12.1–17)
IMM GRANULOCYTES # BLD AUTO: 0 K/UL (ref 0–0.04)
IMM GRANULOCYTES NFR BLD AUTO: 0 % (ref 0–0.5)
LDLC SERPL CALC-MCNC: 153.8 MG/DL (ref 0–100)
LYMPHOCYTES # BLD: 1.9 K/UL (ref 0.8–3.5)
LYMPHOCYTES NFR BLD: 40 % (ref 12–49)
MCH RBC QN AUTO: 34.5 PG (ref 26–34)
MCHC RBC AUTO-ENTMCNC: 31.2 G/DL (ref 30–36.5)
MCV RBC AUTO: 110.4 FL (ref 80–99)
MONOCYTES # BLD: 0.5 K/UL (ref 0–1)
MONOCYTES NFR BLD: 11 % (ref 5–13)
NEUTS SEG # BLD: 2.1 K/UL (ref 1.8–8)
NEUTS SEG NFR BLD: 45 % (ref 32–75)
NRBC # BLD: 0 K/UL (ref 0–0.01)
NRBC BLD-RTO: 0 PER 100 WBC
PHOSPHATE SERPL-MCNC: 3 MG/DL (ref 2.6–4.7)
PLATELET # BLD AUTO: 281 K/UL (ref 150–400)
PMV BLD AUTO: 10.4 FL (ref 8.9–12.9)
POTASSIUM SERPL-SCNC: 4.2 MMOL/L (ref 3.5–5.1)
RBC # BLD AUTO: 4.12 M/UL (ref 4.1–5.7)
RBC MORPH BLD: ABNORMAL
SODIUM SERPL-SCNC: 143 MMOL/L (ref 136–145)
TRIGL SERPL-MCNC: 81 MG/DL (ref ?–150)
VLDLC SERPL CALC-MCNC: 16.2 MG/DL
WBC # BLD AUTO: 4.7 K/UL (ref 4.1–11.1)

## 2022-02-23 RX ORDER — ROSUVASTATIN CALCIUM 20 MG/1
20 TABLET, COATED ORAL
Qty: 30 TABLET | Refills: 11 | Status: SHIPPED | OUTPATIENT
Start: 2022-02-23 | End: 2022-08-25 | Stop reason: SINTOL

## 2022-02-24 LAB — APO B SERPL-MCNC: 123 MG/DL

## 2022-02-25 NOTE — PROGRESS NOTES
This is another way of looking at your cholesterol and confirms the need you continue the atorvastatin

## 2022-02-28 LAB
ALBUMIN SERPL ELPH-MCNC: 3.7 G/DL (ref 2.9–4.4)
ALBUMIN/GLOB SERPL: 1.2 {RATIO} (ref 0.7–1.7)
ALPHA1 GLOB SERPL ELPH-MCNC: 0.2 G/DL (ref 0–0.4)
ALPHA2 GLOB SERPL ELPH-MCNC: 0.6 G/DL (ref 0.4–1)
B-GLOBULIN SERPL ELPH-MCNC: 1.1 G/DL (ref 0.7–1.3)
GAMMA GLOB SERPL ELPH-MCNC: 1.2 G/DL (ref 0.4–1.8)
GLOBULIN SER-MCNC: 3.1 G/DL (ref 2.2–3.9)
IGA SERPL-MCNC: 411 MG/DL (ref 61–437)
IGG SERPL-MCNC: 1407 MG/DL (ref 603–1613)
IGM SERPL-MCNC: 80 MG/DL (ref 15–143)
INTERPRETATION SERPL IEP-IMP: ABNORMAL
KAPPA LC FREE SER-MCNC: 22.7 MG/L (ref 3.3–19.4)
KAPPA LC FREE/LAMBDA FREE SER: 1.34 {RATIO} (ref 0.26–1.65)
LAMBDA LC FREE SERPL-MCNC: 16.9 MG/L (ref 5.7–26.3)
M PROTEIN SERPL ELPH-MCNC: ABNORMAL G/DL
PROT SERPL-MCNC: 6.8 G/DL (ref 6–8.5)

## 2022-03-15 ENCOUNTER — HOSPITAL ENCOUNTER (OUTPATIENT)
Dept: NON INVASIVE DIAGNOSTICS | Age: 82
Discharge: HOME | End: 2022-03-15
Attending: INTERNAL MEDICINE
Payer: MEDICARE

## 2022-03-15 ENCOUNTER — HOSPITAL ENCOUNTER (OUTPATIENT)
Dept: VASCULAR SURGERY | Age: 82
Discharge: HOME | End: 2022-03-15
Attending: INTERNAL MEDICINE
Payer: MEDICARE

## 2022-03-15 ENCOUNTER — HOSPITAL ENCOUNTER (OUTPATIENT)
Dept: MRI IMAGING | Age: 82
Discharge: HOME | End: 2022-03-15
Attending: INTERNAL MEDICINE
Payer: MEDICARE

## 2022-03-15 VITALS
HEIGHT: 64 IN | BODY MASS INDEX: 24.05 KG/M2 | DIASTOLIC BLOOD PRESSURE: 77 MMHG | SYSTOLIC BLOOD PRESSURE: 111 MMHG | WEIGHT: 140.87 LBS

## 2022-03-15 DIAGNOSIS — I42.9 CARDIOMYOPATHY, UNSPECIFIED TYPE (HCC): Primary | ICD-10-CM

## 2022-03-15 DIAGNOSIS — H49.22 SIXTH NERVE PALSY OF LEFT EYE: ICD-10-CM

## 2022-03-15 DIAGNOSIS — G45.8 OTHER SPECIFIED TRANSIENT CEREBRAL ISCHEMIAS: ICD-10-CM

## 2022-03-15 DIAGNOSIS — H49.22 SIXTH NERVE PALSY OF LEFT EYE: Primary | ICD-10-CM

## 2022-03-15 DIAGNOSIS — R20.0 NUMBNESS: ICD-10-CM

## 2022-03-15 LAB
ECHO AO ROOT DIAM: 3.4 CM
ECHO AO ROOT INDEX: 2.01 CM/M2
ECHO AR MAX VEL PISA: 3.6 M/S
ECHO AV AREA PEAK VELOCITY: 3.6 CM2
ECHO AV AREA/BSA PEAK VELOCITY: 2.1 CM2/M2
ECHO AV MEAN GRADIENT: 1 MMHG
ECHO AV MEAN VELOCITY: 0.5 M/S
ECHO AV PEAK GRADIENT: 2 MMHG
ECHO AV PEAK VELOCITY: 0.7 M/S
ECHO AV REGURGITANT PHT: 709.7 MILLISECOND
ECHO AV VELOCITY RATIO: 1
ECHO AV VTI: 11.9 CM
ECHO EST RA PRESSURE: 10 MMHG
ECHO LA DIAMETER INDEX: 1.48 CM/M2
ECHO LA DIAMETER: 2.5 CM
ECHO LA TO AORTIC ROOT RATIO: 0.74
ECHO LA VOL 4C: 22 ML (ref 18–58)
ECHO LA VOLUME INDEX A4C: 13 ML/M2 (ref 16–34)
ECHO LV E' LATERAL VELOCITY: 5 CM/S
ECHO LV E' SEPTAL VELOCITY: 8 CM/S
ECHO LV EDV A4C: 36 ML
ECHO LV EDV INDEX A4C: 21 ML/M2
ECHO LV EJECTION FRACTION A4C: 30 %
ECHO LV ESV A4C: 25 ML
ECHO LV ESV INDEX A4C: 15 ML/M2
ECHO LV FRACTIONAL SHORTENING: 31 % (ref 28–44)
ECHO LV INTERNAL DIMENSION DIASTOLE INDEX: 2.9 CM/M2
ECHO LV INTERNAL DIMENSION DIASTOLIC: 4.9 CM (ref 4.2–5.9)
ECHO LV INTERNAL DIMENSION SYSTOLIC INDEX: 2.01 CM/M2
ECHO LV INTERNAL DIMENSION SYSTOLIC: 3.4 CM
ECHO LV IVSD: 0.8 CM (ref 0.6–1)
ECHO LV MASS 2D: 120.8 G (ref 88–224)
ECHO LV MASS INDEX 2D: 71.5 G/M2 (ref 49–115)
ECHO LV POSTERIOR WALL DIASTOLIC: 0.7 CM (ref 0.6–1)
ECHO LV RELATIVE WALL THICKNESS RATIO: 0.29
ECHO LVOT AREA: 3.5 CM2
ECHO LVOT DIAM: 2.1 CM
ECHO LVOT PEAK GRADIENT: 2 MMHG
ECHO LVOT PEAK VELOCITY: 0.7 M/S
ECHO MV MAX VELOCITY: 0.7 M/S
ECHO MV MEAN GRADIENT: 1 MMHG
ECHO MV MEAN VELOCITY: 0.3 M/S
ECHO MV PEAK GRADIENT: 2 MMHG
ECHO MV REGURGITANT PEAK GRADIENT: 67 MMHG
ECHO MV REGURGITANT PEAK VELOCITY: 4.1 M/S
ECHO MV VTI: 15.8 CM
ECHO PULMONARY ARTERY END DIASTOLIC PRESSURE: 3 MMHG
ECHO PV MAX VELOCITY: 0.7 M/S
ECHO PV PEAK GRADIENT: 2 MMHG
ECHO PV REGURGITANT MAX VELOCITY: 0.9 M/S
ECHO RIGHT VENTRICULAR SYSTOLIC PRESSURE (RVSP): 35 MMHG
ECHO TV REGURGITANT MAX VELOCITY: 2.49 M/S
ECHO TV REGURGITANT PEAK GRADIENT: 25 MMHG

## 2022-03-15 PROCEDURE — 74011250636 HC RX REV CODE- 250/636: Performed by: INTERNAL MEDICINE

## 2022-03-15 PROCEDURE — 70553 MRI BRAIN STEM W/O & W/DYE: CPT

## 2022-03-15 PROCEDURE — 93880 EXTRACRANIAL BILAT STUDY: CPT

## 2022-03-15 PROCEDURE — 93306 TTE W/DOPPLER COMPLETE: CPT

## 2022-03-15 PROCEDURE — A9576 INJ PROHANCE MULTIPACK: HCPCS | Performed by: INTERNAL MEDICINE

## 2022-03-15 RX ADMIN — GADOTERIDOL 13 ML: 279.3 INJECTION, SOLUTION INTRAVENOUS at 10:09

## 2022-03-16 LAB
LEFT CCA DIST DIAS: 14.1 CM/S
LEFT CCA DIST SYS: 67.2 CM/S
LEFT CCA PROX DIAS: 16 CM/S
LEFT CCA PROX SYS: 62.8 CM/S
LEFT ECA DIAS: 10.88 CM/S
LEFT ECA SYS: 54.8 CM/S
LEFT ICA DIST DIAS: 29.6 CM/S
LEFT ICA DIST SYS: 68 CM/S
LEFT ICA MID DIAS: 29.6 CM/S
LEFT ICA MID SYS: 68 CM/S
LEFT ICA PROX DIAS: 21.9 CM/S
LEFT ICA PROX SYS: 57 CM/S
LEFT ICA/CCA SYS: 1.01
LEFT VERTEBRAL DIAS: 8.64 CM/S
LEFT VERTEBRAL SYS: 29.6 CM/S
RIGHT CCA DIST DIAS: 12.9 CM/S
RIGHT CCA DIST SYS: 49.4 CM/S
RIGHT CCA PROX DIAS: 11.6 CM/S
RIGHT CCA PROX SYS: 67.7 CM/S
RIGHT ECA DIAS: 10.3 CM/S
RIGHT ECA SYS: 61.2 CM/S
RIGHT ICA DIST DIAS: 24.7 CM/S
RIGHT ICA DIST SYS: 62.5 CM/S
RIGHT ICA MID DIAS: 20.7 CM/S
RIGHT ICA MID SYS: 57.3 CM/S
RIGHT ICA PROX DIAS: 12.9 CM/S
RIGHT ICA PROX SYS: 75.5 CM/S
RIGHT ICA/CCA SYS: 1.5
RIGHT VERTEBRAL DIAS: 9.51 CM/S
RIGHT VERTEBRAL SYS: 34.8 CM/S
VAS LEFT SUBCLAVIAN PROX EDV: 0 CM/S
VAS LEFT SUBCLAVIAN PROX PSV: 87.1 CM/S
VAS RIGHT SUBCLAVIAN PROX EDV: 0 CM/S
VAS RIGHT SUBCLAVIAN PROX PSV: 102.9 CM/S

## 2022-03-16 NOTE — PROGRESS NOTES
The echocardiogram is abnormal and I will request an opinion from cardiology.   If you do not hear from the  in 1 to 2 days call the office to schedule appointment

## 2022-03-16 NOTE — PROGRESS NOTES
For the abnormal MRI I have requested a neurological opinion.   If you do not hear from the  in the next day or 2 please call the office for scheduling

## 2022-03-17 ENCOUNTER — OFFICE VISIT (OUTPATIENT)
Dept: INTERNAL MEDICINE CLINIC | Age: 82
End: 2022-03-17
Payer: MEDICARE

## 2022-03-17 VITALS
RESPIRATION RATE: 20 BRPM | BODY MASS INDEX: 24.41 KG/M2 | TEMPERATURE: 97.9 F | HEART RATE: 86 BPM | HEIGHT: 64 IN | OXYGEN SATURATION: 95 % | SYSTOLIC BLOOD PRESSURE: 136 MMHG | DIASTOLIC BLOOD PRESSURE: 84 MMHG | WEIGHT: 143 LBS

## 2022-03-17 DIAGNOSIS — I42.9 CARDIOMYOPATHY, UNSPECIFIED TYPE (HCC): ICD-10-CM

## 2022-03-17 DIAGNOSIS — R20.0 NUMBNESS: ICD-10-CM

## 2022-03-17 DIAGNOSIS — J45.41 MODERATE PERSISTENT ASTHMATIC BRONCHITIS WITH ACUTE EXACERBATION: ICD-10-CM

## 2022-03-17 DIAGNOSIS — H49.22 SIXTH NERVE PALSY OF LEFT EYE: Primary | ICD-10-CM

## 2022-03-17 PROCEDURE — 1101F PT FALLS ASSESS-DOCD LE1/YR: CPT | Performed by: INTERNAL MEDICINE

## 2022-03-17 PROCEDURE — G8432 DEP SCR NOT DOC, RNG: HCPCS | Performed by: INTERNAL MEDICINE

## 2022-03-17 PROCEDURE — G8428 CUR MEDS NOT DOCUMENT: HCPCS | Performed by: INTERNAL MEDICINE

## 2022-03-17 PROCEDURE — G8420 CALC BMI NORM PARAMETERS: HCPCS | Performed by: INTERNAL MEDICINE

## 2022-03-17 PROCEDURE — G8536 NO DOC ELDER MAL SCRN: HCPCS | Performed by: INTERNAL MEDICINE

## 2022-03-17 PROCEDURE — 99213 OFFICE O/P EST LOW 20 MIN: CPT | Performed by: INTERNAL MEDICINE

## 2022-03-17 NOTE — PROGRESS NOTES
SPORTS MEDICINE AND PRIMARY CARE  Brittany Deleon MD, [address] [address]  Phone:  [phone]  Fax: [phone]      Chief Complaint   Patient presents with    Cholesterol Problem     follow up         [address] [place] [address]:    Arlet Prado is a [de-identified] y.o. male Patient returns today with a history of sixth nerve palsy of left eye, followed by Amanda Munguia MD, and complained of right sided numbness. 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 They may be on the basis of microvascular disease, but also may represent cerebral amyloid angiopathy. Other medical problems include asthmatic bronchitis. Patient returns today saying, \"I'm trying not to raise too much energy or do too much until I finish all of these tests\". He is not lifting too much, not doing garden work, work in the yard or helping others. He has a referral to see Dr. Rowdy Howell on August 15th at 8:30. The double vision is still present, but not as severe, and it seems like it is going away, he states. The numbness he described on the last visit has resolved. He is also having slight discomfort in the frontal area. Patient is also concerned about his cholesterol and is seen for evaluation. Current Outpatient Medications   Medication Sig Dispense Refill    rosuvastatin (CRESTOR) 20 mg tablet Take 1 Tablet by mouth nightly. 30 Tablet 11    aspirin delayed-release 81 mg tablet Take 1 Tablet by mouth daily. 90 Tablet 3    albuterol (PROVENTIL VENTOLIN) 2.5 mg /3 mL (0.083 %) nebu 3 ML BY NEBULIZATION ROUTE EVERY FOUR (4) HOURS AS NEEDED FOR WHEEZING.  150 mL 11     Past Medical History:   Diagnosis Date    Asthmatic bronchitis     Back pain     Brachycephaly 2006    Cancer of prostate w/high recur risk (T3a or [place] 8-10 or PSA>20)     Cardiomyopathy ([facility] [address] 03/15/2022    EF of 35 - 40%    Colonoscopy refused 2016    not this year    Fall at home     Hard palate abscess     Inguinal hernia of left side without obstruction or gangrene 10/05/2021    ct    Lipoma of flank     Prostate CA ([facility] [address]     chetan shields md    PSA elevation     S/P colonoscopy 06    Sixth nerve palsy of left eye 2022    Kirstin Arnold MD -with horizontal diplopia the cause is typically a microvascular infarct of the nerve. Most cases resolve spontaneously within 3 months. We will hold off on neuroimaging at this time. If fails to improve or worsens over the next few weeks will consider neuroimaging refer to PCP to evaluate cardiovascular risk factors and optimize    Toothache      History reviewed. No pertinent surgical history. No Known Allergies    REVIEW OF SYSTEMS:   No chest pain, no shortness of breath. Social History     Socioeconomic History    Marital status: SINGLE   Tobacco Use    Smoking status: Never Smoker    Smokeless tobacco: Never Used   Vaping Use    Vaping Use: Never used   Substance and Sexual Activity    Alcohol use: No    Drug use: No    Sexual activity: Yes     Partners: Female   Social History Narrative    Family History: Mother: , natural causesFather: , natural causesSister(s): aliveBrother(s): aliveDaughter(s):aliveGrandmother: Greg Rouse brother(s) , 5 sister(s) - healthy. 1daughter(s) - healthy. Social History: Alcohol Use Patient does not use alcohol. Smoking Status Patient is a former smoker, Quit in: 40 years ago. Caffeine: none,coffee, per day, soda diet, occasional, tea. Marital Status: Single. Lives w ith: alone w ith grandson. Children: yes, daughters. Occupation/W ork:    retired.   r  History reviewed. No pertinent family history.     OBJECTIVE:  Visit Vitals  /84   Pulse 86   Temp 97.9 °F (36.6 °C) (Oral)   Resp 20   Ht 5' 4\" (1.626 m)   Wt 143 lb (64.9 kg)   SpO2 95%   BMI 24.55 kg/m²     ENT: perrla,  eom intact  NECK: supple.  Thyroid normal  CHEST: clear to ascultation and percussion   HEART: regular rate and rhythm  ABD: soft, bowel sounds active  EXTREMITIES: no edema, pulse 1+     Hospital Outpatient Visit on 03/15/2022   Component Date Value Ref Range Status    IVSd 03/15/2022 0.8  0.6 - 1.0 cm Final    LVIDd 03/15/2022 4.9  4.2 - 5.9 cm Final    LVIDs 03/15/2022 3.4  cm Final    LVOT Diameter 03/15/2022 2.1  cm Final    LVPWd 03/15/2022 0.7  0.6 - 1.0 cm Final    LV Ejection Fraction A4C 03/15/2022 30  % Final    LV EDV A4C 03/15/2022 36  mL Final    LV ESV A4C 03/15/2022 25  mL Final    LVOT Peak Gradient 03/15/2022 2  mmHg Final    LVOT Peak Velocity 03/15/2022 0.7  m/s Final    RVSP 03/15/2022 35  mmHg Final    LA Diameter 03/15/2022 2.5  cm Final    LA Volume 4C 03/15/2022 22  18 - 58 mL Final    Est. RA Pressure 03/15/2022 10  mmHg Final    AV Area by Peak Velocity 03/15/2022 3.6  cm2 Final    AR PHT 03/15/2022 709.7  millisecond Final    AR Max Velocity PISA 03/15/2022 3.6  m/s Final    AV Peak Gradient 03/15/2022 2  mmHg Final    AV Mean Gradient 03/15/2022 1  mmHg Final    AV Peak Velocity 03/15/2022 0.7  m/s Final    AV Mean Velocity 03/15/2022 0.5  m/s Final    AV VTI 03/15/2022 11.9  cm Final    LV E' Lateral Velocity 03/15/2022 5  cm/s Final    LV E' Septal Velocity 03/15/2022 8  cm/s Final    MR Peak Gradient 03/15/2022 67  mmHg Final    MR Peak Velocity 03/15/2022 4.1  m/s Final    MV Peak Gradient 03/15/2022 2  mmHg Final    MV Mean Gradient 03/15/2022 1  mmHg Final    MV Max Velocity 03/15/2022 0.7  m/s Final    MV Mean Velocity 03/15/2022 0.3  m/s Final    MV VTI 03/15/2022 15.8  cm Final    Pulmonary Artery EDP 03/15/2022 3  mmHg Final    IN Max Velocity 03/15/2022 0.9  m/s Final    PV Peak Gradient 03/15/2022 2  mmHg Final  PV Max Velocity 03/15/2022 0.7  m/s Final    TR Peak Gradient 03/15/2022 25  mmHg Final    TR Max Velocity 03/15/2022 2.49  m/s Final    Aortic Root 03/15/2022 3.4  cm Final    Fractional Shortening 2D 03/15/2022 31  28 - 44 % Final    LV ESV Index A4C 03/15/2022 15  mL/m2 Final    LV EDV Index A4C 03/15/2022 21  mL/m2 Final    LVIDd Index 03/15/2022 2.90  cm/m2 Final    LVIDs Index 03/15/2022 2.01  cm/m2 Final    LV RWT Ratio 03/15/2022 0.29   Final    LV Mass 2D 03/15/2022 120.8  88 - 224 g Final    LV Mass 2D Index 03/15/2022 71.5  49 - 115 g/m2 Final    LVOT Area 03/15/2022 3.5  cm2 Final    LA Volume Index 4C 03/15/2022 13* 16 - 34 mL/m2 Final    LA Size Index 03/15/2022 1.48  cm/m2 Final    LA/AO Root Ratio 03/15/2022 0.74   Final    Ao Root Index 03/15/2022 2.01  cm/m2 Final    AV Velocity Ratio 03/15/2022 1.00   Final    MEHUL/BSA Peak Velocity 03/15/2022 2.1  cm2/m2 Final   Hospital Outpatient Visit on 03/15/2022   Component Date Value Ref Range Status    Right subclavian prox PSV 03/15/2022 102.9  cm/s Final    Right subclavian prox EDV 03/15/2022 0.0  cm/s Final    Right cca dist sys 03/15/2022 49.4  cm/s Final    Right CCA dist hyman 03/15/2022 12.9  cm/s Final    Right CCA prox sys 03/15/2022 67.7  cm/s Final    Right CCA prox hyman 03/15/2022 11.6  cm/s Final    Right ICA dist sys 03/15/2022 62.5  cm/s Final    Right ICA dist hyman 03/15/2022 24.7  cm/s Final    Right ICA mid sys 03/15/2022 57.3  cm/s Final    Right ICA mid hyman 03/15/2022 20.7  cm/s Final    Right ICA prox sys 03/15/2022 75.5  cm/s Final    Right ICA prox hyman 03/15/2022 12.9  cm/s Final    Right eca sys 03/15/2022 61.2  cm/s Final    RIGHT EXTERNAL CAROTID ARTERY D 03/15/2022 10.30  cm/s Final    Right vertebral sys 03/15/2022 34.8  cm/s Final    RIGHT VERTEBRAL ARTERY D 03/15/2022 9.51  cm/s Final    Right ICA/CCA sys 03/15/2022 1.5   Final    Left subclavian prox PSV 03/15/2022 87.1  cm/s Final  Left subclavian prox EDV 03/15/2022 0.0  cm/s Final    Left CCA dist sys 03/15/2022 67.2  cm/s Final    Left CCA dist hyman 03/15/2022 14.1  cm/s Final    Left CCA prox sys 03/15/2022 62.8  cm/s Final    Left CCA prox hyman 03/15/2022 16.0  cm/s Final    Left ICA dist sys 03/15/2022 68.0  cm/s Final    Left ICA dist hyman 03/15/2022 29.6  cm/s Final    Left ICA mid sys 03/15/2022 68.0  cm/s Final    Left ICA mid hyman 03/15/2022 29.6  cm/s Final    Left ICA prox sys 03/15/2022 57.0  cm/s Final    Left ICA prox hyman 03/15/2022 21.9  cm/s Final    Left ECA sys 03/15/2022 54.8  cm/s Final    LEFT EXTERNAL CAROTID ARTERY D 03/15/2022 10.88  cm/s Final    Left vertebral sys 03/15/2022 29.6  cm/s Final    LEFT VERTEBRAL ARTERY D 03/15/2022 8.64  cm/s Final    Left ICA/CCA sys 03/15/2022 1.01   Final   Office Visit on 02/22/2022   Component Date Value Ref Range Status    Apolipoprotein B 02/22/2022 123* <90 mg/dL Final    Comment: (NOTE)                          Desirable               < 90                          Borderline High     90 -  99                          High               100 - 130                          Very High               >130      --------------------------------------------------           ASCVD RISK              THERAPEUTIC TARGET            CATEGORY                  APO B (mg/dL)         Very High Risk        <80 (if extreme risk <70)         High Risk             <90         Moderate Risk         <90  Performed At: [facility] [address] [place], [place] [id number]  Melissa Cardoso MD XB:[phone]      Cholesterol, total 02/22/2022 240* <200 MG/DL Final    Triglyceride 02/22/2022 81  <150 MG/DL Final    Comment: Based on NCEP-ATP III:  Triglycerides <150 mg/dL  is considered normal, 150-199  mg/dL  borderline high,  200-499 mg/dL high and  greater than or equal to 500  mg/dL very high.       HDL Cholesterol 02/22/2022 70  MG/DL Final    Comment: Based on NCEP ATP III, HDL Cholesterol <40 mg/dL is considered low and >60  mg/dL is elevated.  LDL, calculated 02/22/2022 153.8* 0 - 100 MG/DL Final    Comment: Based on the NCEP-ATP: LDL-C concentrations are considered  optimal <100 mg/dL,  near optimal/above Normal 100-129 mg/dL Borderline High: 130-159, High: 160-189  mg/dL Very High: Greater than or equal to 190 mg/dL      VLDL, calculated 02/22/2022 16.2  MG/DL Final    CHOL/HDL Ratio 02/22/2022 3.4  0.0 - 5.0   Final    Sodium 02/22/2022 143  136 - 145 mmol/L Final    Potassium 02/22/2022 4.2  3.5 - 5.1 mmol/L Final    Chloride 02/22/2022 110* 97 - 108 mmol/L Final    CO2 02/22/2022 28  21 - 32 mmol/L Final    Anion gap 02/22/2022 5  5 - 15 mmol/L Final    Glucose 02/22/2022 86  65 - 100 mg/dL Final    BUN 02/22/2022 18  6 - 20 MG/DL Final    Creatinine 02/22/2022 1.19  0.70 - 1.30 MG/DL Final    BUN/Creatinine ratio 02/22/2022 15  12 - 20   Final    GFR est AA 02/22/2022 >60  >60 ml/min/1.73m2 Final    GFR est non-AA 02/22/2022 59* >60 ml/min/1.73m2 Final    Comment: Estimated GFR is calculated using the IDMS-traceable Modification of Diet in  Renal Disease (MDRD) Study equation, reported for both  Americans  (GFRAA) and non- Americans (GFRNA), and normalized to 1.73m2 body  surface area. The physician must decide which value applies to the patient.       Calcium 02/22/2022 9.3  8.5 - 10.1 MG/DL Final    Phosphorus 02/22/2022 3.0  2.6 - 4.7 MG/DL Final    Albumin 02/22/2022 3.5  3.5 - 5.0 g/dL Final    WBC 02/22/2022 4.7  4.1 - 11.1 K/uL Final    RBC 02/22/2022 4.12  4.10 - 5.70 M/uL Final    HGB 02/22/2022 14.2  12.1 - 17.0 g/dL Final    HCT 02/22/2022 45.5  36.6 - 50.3 % Final    MCV 02/22/2022 110.4* 80.0 - 99.0 FL Final    MCH 02/22/2022 34.5* 26.0 - 34.0 PG Final    MCHC 02/22/2022 31.2  30.0 - 36.5 g/dL Final    RDW 02/22/2022 11.6  11.5 - 14.5 % Final    PLATELET 52/37/9876 791  150 - 400 K/uL Final    MPV 02/22/2022 10.4 8.9 - 12.9 FL Final    NRBC 02/22/2022 0.0  0  WBC Final    ABSOLUTE NRBC 02/22/2022 0.00  0.00 - 0.01 K/uL Final    NEUTROPHILS 02/22/2022 45  32 - 75 % Final    LYMPHOCYTES 02/22/2022 40  12 - 49 % Final    MONOCYTES 02/22/2022 11  5 - 13 % Final    EOSINOPHILS 02/22/2022 2  0 - 7 % Final    BASOPHILS 02/22/2022 2* 0 - 1 % Final    IMMATURE GRANULOCYTES 02/22/2022 0  0.0 - 0.5 % Final    ABS. NEUTROPHILS 02/22/2022 2.1  1.8 - 8.0 K/UL Final    ABS. LYMPHOCYTES 02/22/2022 1.9  0.8 - 3.5 K/UL Final    ABS. MONOCYTES 02/22/2022 0.5  0.0 - 1.0 K/UL Final    ABS. EOSINOPHILS 02/22/2022 0.1  0.0 - 0.4 K/UL Final    ABS. BASOPHILS 02/22/2022 0.1  0.0 - 0.1 K/UL Final    ABS. IMM. GRANS. 02/22/2022 0.0  0.00 - 0.04 K/UL Final    DF 02/22/2022 SMEAR SCANNED    Final    RBC COMMENTS 02/22/2022     Final                    Value:MACROCYTOSIS  1+      Immunoglobulin G, Qt. 02/22/2022 1,407  603 - 1,613 mg/dL Final    Immunoglobulin A, Qt. 02/22/2022 411  61 - 437 mg/dL Final    Immunoglobulin M, Qt. 02/22/2022 80  15 - 143 mg/dL Final    Protein, total 02/22/2022 6.8  6.0 - 8.5 g/dL Final    Albumin 02/22/2022 3.7  2.9 - 4.4 g/dL Final    Alpha-1-Globulin 02/22/2022 0.2  0.0 - 0.4 g/dL Final    Alpha-2-Globulin 02/22/2022 0.6  0.4 - 1.0 g/dL Final    Beta Globulin 02/22/2022 1.1  0.7 - 1.3 g/dL Final    Gamma Globulin 02/22/2022 1.2  0.4 - 1.8 g/dL Final    M-Julio Cesar 02/22/2022 Not Observed  Not Observed g/dL Final    Globulin, total 02/22/2022 3.1  2.2 - 3.9 g/dL Final    A/G ratio 02/22/2022 1.2  0.7 - 1.7   Final    Immunofixation Result 02/22/2022 Comment*   Final    Comment: (NOTE)  Polyclonal increase detected in one or more immunoglobulins.       Free Kappa Lt Chains, serum 02/22/2022 22.7* 3.3 - 19.4 mg/L Final    Free Lambda Lt Chains, serum 02/22/2022 16.9  5.7 - 26.3 mg/L Final    Kappa/Lambda ratio, serum 02/22/2022 1.34  0.26 - 1.65   Final    Comment: (NOTE)  Performed At: [facility] Lucia Quesada  [address] [id number]  Rich Williamson MD RT:[id number]            ASSESSMENT:  1. Sixth nerve palsy of left eye    2. Numbness    3. Moderate persistent asthmatic bronchitis with acute exacerbation    4. Cardiomyopathy, unspecified type ([address]      Fortunately, the sixth nerve palsy is improving. The numbness resolved. No evidence of bronchospasm on exam today. He has cardiomyopathy with EF 35-40%, for which we will ask cardiology opinion. The MRI is reviewed with him and we will ask for neurological opinion. I am not sure anything further needs to be done or evaluation, but he is requesting and I agree with a neurology opinion. He will be back to see me in three months, sooner if he has any problems. I have discussed the diagnosis with the patient and the intended plan as seen in the  orders above. The patient understands and agees with the plan. The patient has   received an after visit summary and questions were answered concerning  future plans  Patient labs and/or xrays were reviewed  Past records were reviewed. PLAN:  . No orders of the defined types were placed in this encounter. Follow-up and Dispositions    · Return in about 3 months (around 6/17/2022). ATTENTION:   This medical record was transcribed using an electronic medical records system. Although proofread, it may and can contain electronic and spelling errors. Other human spelling and other errors may be present. Corrections may be executed at a later time. Please feel free to contact us for any clarifications as needed.

## 2022-03-17 NOTE — PROGRESS NOTES
1. Have you been to the ER, urgent care clinic since your last visit? Hospitalized since your last visit? No    2. Have you seen or consulted any other health care providers outside of the [address] [place] [address] since your last visit? Include any pap smears or colon screening.  No

## 2022-03-18 PROBLEM — K52.9 ENTERITIS: Status: ACTIVE | Noted: 2021-10-07

## 2022-03-19 PROBLEM — K40.90 INGUINAL HERNIA OF LEFT SIDE WITHOUT OBSTRUCTION OR GANGRENE: Status: ACTIVE | Noted: 2021-10-05

## 2022-03-19 PROBLEM — R20.0 NUMBNESS: Status: ACTIVE | Noted: 2022-02-22

## 2022-03-19 PROBLEM — H49.22 SIXTH NERVE PALSY OF LEFT EYE: Status: ACTIVE | Noted: 2022-02-04

## 2022-03-20 PROBLEM — R07.89 OTHER CHEST PAIN: Status: ACTIVE | Noted: 2021-04-01

## 2022-03-20 PROBLEM — Z85.46 H/O PROSTATE CANCER: Status: ACTIVE | Noted: 2020-03-26

## 2022-03-24 PROBLEM — I42.9 CARDIOMYOPATHY (HCC): Status: ACTIVE | Noted: 2022-03-15

## 2022-04-30 DIAGNOSIS — E78.5 DYSLIPIDEMIA: Primary | ICD-10-CM

## 2022-08-15 ENCOUNTER — OFFICE VISIT (OUTPATIENT)
Dept: NEUROLOGY | Age: 82
End: 2022-08-15
Payer: MEDICARE

## 2022-08-15 VITALS
SYSTOLIC BLOOD PRESSURE: 118 MMHG | DIASTOLIC BLOOD PRESSURE: 68 MMHG | WEIGHT: 138 LBS | RESPIRATION RATE: 16 BRPM | HEIGHT: 64 IN | OXYGEN SATURATION: 97 % | BODY MASS INDEX: 23.56 KG/M2 | HEART RATE: 91 BPM

## 2022-08-15 DIAGNOSIS — H49.22 SIXTH (ABDUCENT) NERVE PALSY, LEFT EYE: ICD-10-CM

## 2022-08-15 DIAGNOSIS — I67.82 CHRONIC CEREBRAL ISCHEMIA: Primary | ICD-10-CM

## 2022-08-15 PROCEDURE — G8536 NO DOC ELDER MAL SCRN: HCPCS | Performed by: PSYCHIATRY & NEUROLOGY

## 2022-08-15 PROCEDURE — G8420 CALC BMI NORM PARAMETERS: HCPCS | Performed by: PSYCHIATRY & NEUROLOGY

## 2022-08-15 PROCEDURE — G8427 DOCREV CUR MEDS BY ELIG CLIN: HCPCS | Performed by: PSYCHIATRY & NEUROLOGY

## 2022-08-15 PROCEDURE — G8510 SCR DEP NEG, NO PLAN REQD: HCPCS | Performed by: PSYCHIATRY & NEUROLOGY

## 2022-08-15 PROCEDURE — 1123F ACP DISCUSS/DSCN MKR DOCD: CPT | Performed by: PSYCHIATRY & NEUROLOGY

## 2022-08-15 PROCEDURE — 1101F PT FALLS ASSESS-DOCD LE1/YR: CPT | Performed by: PSYCHIATRY & NEUROLOGY

## 2022-08-15 PROCEDURE — 99204 OFFICE O/P NEW MOD 45 MIN: CPT | Performed by: PSYCHIATRY & NEUROLOGY

## 2022-08-15 NOTE — PROGRESS NOTES
Chief Complaint   Patient presents with    New Patient     Numbness: Patient reports finger and face feels numb (few months ago). Referred by: DR Johnson      [facility]    Mr. Robert Durán is a 51-year-old gentleman with a history of prostate cancer here for 2 main concerns. He tells me in February he had double vision and was seen by ophthalmology diagnosed with a microvascular infarct. He had a left 6 palsy which now has been corrected with prisms. He feels better. At the same time approximately he was having patchy numbness of the left hand around the thumb and the left face intermittently. No radicular pain. No weakness. All symptoms have slowly improved. About a month after onset he had an MRI which showed chronic ischemic changes but no acute issue. There was some question of amyloid. He was prescribed aspirin but he is not taking it because he thought it made him feel funny. He was also given statins for cholesterol but he had muscle aches so he stopped the medication. Overall he is doing fine. Review of Systems   Eyes:  Negative for double vision. Neurological:  Negative for sensory change, speech change and seizures. All other systems reviewed and are negative. 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    Cancer of prostate w/high recur risk (T3a or [place] 8-10 or PSA>20)     Cardiomyopathy ([address] 03/15/2022    EF of 35 - 40%    Colonoscopy refused 12/01/2016    not this year    Fall at home     Hard palate abscess     Inguinal hernia of left side without obstruction or gangrene 10/05/2021    ct    Lipoma of flank     Prostate CA ([address] 2006    chetan shields md    PSA elevation     S/P colonoscopy 8-11-06    Sixth nerve palsy of left eye 02/04/2022    Lars Mejia MD -with horizontal diplopia the cause is typically a microvascular infarct of the nerve. Most cases resolve spontaneously within 3 months.   We will hold off on neuroimaging at this time. If fails to improve or worsens over the next few weeks will consider neuroimaging refer to PCP to evaluate cardiovascular risk factors and optimize    Toothache      History reviewed. No pertinent family history. Social History     Socioeconomic History    Marital status: SINGLE     Spouse name: Not on file    Number of children: Not on file    Years of education: Not on file    Highest education level: Not on file   Occupational History    Not on file   Tobacco Use    Smoking status: Never    Smokeless tobacco: Never   Vaping Use    Vaping Use: Never used   Substance and Sexual Activity    Alcohol use: No    Drug use: No    Sexual activity: Yes     Partners: Female   Other Topics Concern    Not on file   Social History Narrative    Family History: Mother: , natural causesFather: , natural causesSister(s): aliveBrother(s): aliveDaughter(s):aliveGrandmother: Barrera Campos brother(s) , 5 sister(s) - healthy. 1daughter(s) - healthy. Social History: Alcohol Use Patient does not use alcohol. Smoking Status Patient is a former smoker, Quit in: 40 years ago. Caffeine: none,coffee, per day, soda diet, occasional, tea. Marital Status: Single. Lives w ith: alone w ith grandson. Children: yes, daughters. Occupation/W ork:    retired. Social Determinants of Health     Financial Resource Strain: Not on file   Food Insecurity: Not on file   Transportation Needs: Not on file   Physical Activity: Not on file   Stress: Not on file   Social Connections: Not on file   Intimate Partner Violence: Not on file   Housing Stability: Not on file     Current Outpatient Medications   Medication Sig    rosuvastatin (CRESTOR) 20 mg tablet Take 1 Tablet by mouth nightly. aspirin delayed-release 81 mg tablet Take 1 Tablet by mouth daily. albuterol (PROVENTIL VENTOLIN) 2.5 mg /3 mL (0.083 %) nebu 3 ML BY NEBULIZATION ROUTE EVERY FOUR (4) HOURS AS NEEDED FOR WHEEZING.      No current facility-administered medications for this visit. No Known Allergies      Neurologic Exam     Mental Status   Oriented to person, place, and time. Cranial Nerves   Cranial nerves II through XII intact. Motor Exam   Muscle bulk: normal    Sensory Exam   Light touch normal.     Gait, Coordination, and Reflexes     Gait  Gait: normal  Physical Exam  Vitals and nursing note reviewed. Constitutional:       Appearance: Normal appearance. He is normal weight. Cardiovascular:      Rate and Rhythm: Normal rate. Pulmonary:      Effort: Pulmonary effort is normal.   Neurological:      Mental Status: He is alert and oriented to person, place, and time. Gait: Gait is intact. Visit Vitals  /68   Pulse 91   Resp 16   Ht 5' 4\" (1.626 m)   Wt 138 lb (62.6 kg)   SpO2 97%   BMI 23.69 kg/m²       Lab Results   Component Value Date/Time    WBC 4.7 02/22/2022 10:00 AM    HGB 14.2 02/22/2022 10:00 AM    HCT 45.5 02/22/2022 10:00 AM    PLATELET 820 00/65/3153 10:00 AM    .4 (H) 02/22/2022 10:00 AM     Lab Results   Component Value Date/Time    Hemoglobin A1c 5.0 04/01/2021 02:09 PM    Hemoglobin A1c 5.1 12/31/2018 12:51 PM    Hemoglobin A1c 5.1 06/13/2016 12:58 PM    Glucose 86 02/22/2022 10:00 AM    Glucose,  (H) 10/04/2021 10:40 PM    LDL, calculated 153.8 (H) 02/22/2022 10:00 AM    Creatinine, POC 1.1 10/04/2021 10:40 PM    Creatinine 1.19 02/22/2022 10:00 AM      Lab Results   Component Value Date/Time    Cholesterol, total 240 (H) 02/22/2022 10:00 AM    HDL Cholesterol 70 02/22/2022 10:00 AM    LDL, calculated 153.8 (H) 02/22/2022 10:00 AM    Triglyceride 81 02/22/2022 10:00 AM    CHOL/HDL Ratio 3.4 02/22/2022 10:00 AM        CT Results (maximum last 3): Results from [place] encounter on 10/04/21    CT ABD PELV W CONT    Narrative  EXAM: CT ABD PELV W CONT    INDICATION: Hematemesis on induced vomiting. Abdominal pain. Normal white blood  cell count.     COMPARISON: None.    CONTRAST: 100 mL of Isovue-370. TECHNIQUE:  Following the uneventful intravenous administration of contrast, thin axial  images were obtained through the abdomen and pelvis. Coronal and sagittal  reconstructions were generated. Oral contrast was not administered. CT dose  reduction was achieved through use of a standardized protocol tailored for this  examination and automatic exposure control for dose modulation. FINDINGS:  LOWER THORAX: No significant abnormality in the incidentally imaged lower chest.  LIVER: Multiple tiny cysts. No solid mass. Smooth surface. BILIARY TREE: Gallbladder is within normal limits. CBD is not dilated. SPLEEN: within normal limits. PANCREAS: No mass or ductal dilatation. ADRENALS: Unremarkable. KIDNEYS: No mass, calculus, or hydronephrosis. STOMACH: Unremarkable. SMALL BOWEL: Intermittent mural thickening of ileal loops. Terminal ileum is  within normal limits. No obstruction. COLON: Fluid-filled but nonobstructive. APPENDIX: Normal.  PERITONEUM: No ascites or pneumoperitoneum. RETROPERITONEUM: No lymphadenopathy or aortic aneurysm. REPRODUCTIVE ORGANS: Prostate gland contains radiation seeds. URINARY BLADDER: Prostatomegaly versus urinary bladder mass is in the base of  the urinary bladder. BONES: No destructive bone lesion. ABDOMINAL WALL: No mass. Small left inguinal hernia contains nonobstructed small  bowel. ADDITIONAL COMMENTS: N/A    Impression  1. Infectious versus inflammatory enteritis. No bowel obstruction. 2. Prostatomegaly versus urinary bladder mass. Correlate with previous urinary  bladder imaging. 3. Nonobstructing small left inguinal hernia. MRI Results (maximum last 3): Results from [place] encounter on 03/15/22    MRI BRAIN W WO CONT    Narrative  EXAM:  MRI BRAIN W WO CONT    INDICATION:    Left eye 6th nerve palsy. COMPARISON:  None. CONTRAST: 13 ml ProHance.     TECHNIQUE:  Multiplanar multisequence acquisition without and with contrast of the brain. FINDINGS:  The ventricles are normal in size and position. Few scattered periventricular  and deep white matter T2/FLAIR hyperintensities, consistent with mild chronic  microvascular ischemic disease. Small chronic infarcts in the right corona  radiata and basal ganglia, and left vita. Few small peripheral chronic  microhemorrhages noted in the bilateral parieto-occipital lobes. Small chronic  microhemorrhage in the posterior limb of the right internal capsule. There is no  acute infarct, hemorrhage, extra-axial fluid collection, or mass effect. There  is no cerebellar tonsillar herniation. Expected arterial flow-voids are present. No evidence of abnormal enhancement. Incidental small developmental venous  anomaly in the left parietal lobe. The paranasal sinuses are clear. Large right mastoid effusion. The orbital  contents are within normal limits. No significant osseous or scalp lesions are  identified. Impression  1. No acute intracranial abnormality. 2. Mild chronic microvascular ischemic disease. Small chronic infarcts in the  right corona radiata and basal ganglia, and left vita. 3. Few chronic microhemorrhages as above, many located peripherally in the  parieto-occipital lobes. These may be on the basis of microvascular disease, but  may also represent cerebral amyloid angiopathy. Correlate clinically. PET Results (maximum last 3): No results found for this or any previous visit. Assessment and Plan   Diagnoses and all orders for this visit:    1. Chronic cerebral ischemia    2. Sixth (abducent) nerve palsy, left eye    60-year-old gentleman who in February approximately had acute onset diplopia and some patchy numbness of the left arm nonradicular. I suspect he probably had a mild stroke. By the time imaging was done we could not see any acute issue. The question of amyloid is very minimal and I am not impressed by that.   Overall he has some chronic changes in the brain but nothing unusual or unexpected. I stressed to him that he really should be taking his daily baby aspirin he is going to resume that. He should make sure that it is enteric-coated. He does not tolerate statins so defer. We did stroke education in detail. If this were to happen again or anything new or different he needs to come to the ER immediately. Continue to follow-up with primary care. 39 of time was spent reviewing the medical record today to include personal review of the neuroimaging, primary care documentation, face-to-face time with he and his wife discussing his symptoms and medications, and time completing medical documentation today. I reviewed and decided to continue the current medications. This clinical note was dictated with an electronic dictation software that can make unintentional errors. If there are any questions, please contact me directly for clarification. A notice of this visit/encounter being completed has been sent electronically to the patient's PCP and/or referring provider.      [address] [place] [address], [facility] [address]  Diplomate NOBLE

## 2022-08-15 NOTE — PROGRESS NOTES
Chief Complaint   Patient presents with    New Patient     Numbness: Patient reports finger and face feels numb (few months ago).      Visit Vitals  /68   Pulse 91   Resp 16   Ht 5' 4\" (1.626 m)   Wt 138 lb (62.6 kg)   SpO2 97%   BMI 23.69 kg/m²

## 2022-08-25 ENCOUNTER — OFFICE VISIT (OUTPATIENT)
Dept: INTERNAL MEDICINE CLINIC | Age: 82
End: 2022-08-25
Payer: MEDICARE

## 2022-08-25 VITALS
TEMPERATURE: 97.9 F | WEIGHT: 139.9 LBS | SYSTOLIC BLOOD PRESSURE: 111 MMHG | BODY MASS INDEX: 23.88 KG/M2 | HEIGHT: 64 IN | HEART RATE: 91 BPM | RESPIRATION RATE: 18 BRPM | DIASTOLIC BLOOD PRESSURE: 72 MMHG | OXYGEN SATURATION: 97 %

## 2022-08-25 DIAGNOSIS — I67.82 CHRONIC CEREBRAL ISCHEMIA: ICD-10-CM

## 2022-08-25 DIAGNOSIS — Z13.39 SCREENING FOR ALCOHOLISM: ICD-10-CM

## 2022-08-25 DIAGNOSIS — Z00.00 MEDICARE ANNUAL WELLNESS VISIT, SUBSEQUENT: Primary | ICD-10-CM

## 2022-08-25 DIAGNOSIS — H49.22 SIXTH NERVE PALSY OF LEFT EYE: ICD-10-CM

## 2022-08-25 DIAGNOSIS — E78.5 DYSLIPIDEMIA: ICD-10-CM

## 2022-08-25 DIAGNOSIS — C61 PROSTATE CA (HCC): ICD-10-CM

## 2022-08-25 LAB
CHOLEST SERPL-MCNC: 218 MG/DL
HDLC SERPL-MCNC: 75 MG/DL
HDLC SERPL: 2.9 {RATIO} (ref 0–5)
LDLC SERPL CALC-MCNC: 125.2 MG/DL (ref 0–100)
TRIGL SERPL-MCNC: 89 MG/DL (ref ?–150)
VLDLC SERPL CALC-MCNC: 17.8 MG/DL

## 2022-08-25 PROCEDURE — 1101F PT FALLS ASSESS-DOCD LE1/YR: CPT | Performed by: INTERNAL MEDICINE

## 2022-08-25 PROCEDURE — G8427 DOCREV CUR MEDS BY ELIG CLIN: HCPCS | Performed by: INTERNAL MEDICINE

## 2022-08-25 PROCEDURE — 99213 OFFICE O/P EST LOW 20 MIN: CPT | Performed by: INTERNAL MEDICINE

## 2022-08-25 PROCEDURE — G8432 DEP SCR NOT DOC, RNG: HCPCS | Performed by: INTERNAL MEDICINE

## 2022-08-25 PROCEDURE — 1123F ACP DISCUSS/DSCN MKR DOCD: CPT | Performed by: INTERNAL MEDICINE

## 2022-08-25 PROCEDURE — G0439 PPPS, SUBSEQ VISIT: HCPCS | Performed by: INTERNAL MEDICINE

## 2022-08-25 PROCEDURE — G8420 CALC BMI NORM PARAMETERS: HCPCS | Performed by: INTERNAL MEDICINE

## 2022-08-25 PROCEDURE — G8536 NO DOC ELDER MAL SCRN: HCPCS | Performed by: INTERNAL MEDICINE

## 2022-08-25 RX ORDER — ATORVASTATIN CALCIUM 10 MG/1
10 TABLET, FILM COATED ORAL DAILY
Qty: 30 TABLET | Refills: 11 | Status: SHIPPED | OUTPATIENT
Start: 2022-08-25

## 2022-08-25 NOTE — PROGRESS NOTES
[facility]  Rufino Colunga MD, [address] [address]  Phone:  [phone]  Fax: [phone]       Chief Complaint   Patient presents with    Cholesterol Problem   . SUBJECTIVE:    Elaina Sherman is a [de-identified] y.o. male Patient returns today after a visit with Nika Bennett on 08/15/20, where she found chronic cerebral ischemia and sixth (abducens) nerve palsy on the left. She noted overall some chronic change in the brain, but nothing unusual or unexpected. She stressed to him about taking the aspirin daily and noted that he was intolerant of statins. He was given stroke education. Other medical problems include dyslipidemia, prostate cancer, and is seen for evaluation. He says he has cramps in his legs when he takes Rosuvastatin and therefore he has not been taking it regularly, but he has been taking it some. Other new complaints denied and patient is seen for evaluation. Current Outpatient Medications   Medication Sig Dispense Refill    atorvastatin (LIPITOR) 10 mg tablet Take 1 Tablet by mouth daily. 30 Tablet 11    aspirin delayed-release 81 mg tablet Take 1 Tablet by mouth daily. 90 Tablet 3    albuterol (PROVENTIL VENTOLIN) 2.5 mg /3 mL (0.083 %) nebu 3 ML BY NEBULIZATION ROUTE EVERY FOUR (4) HOURS AS NEEDED FOR WHEEZING.  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    Cancer of prostate w/high recur risk (T3a or [place] 8-10 or PSA>20)     Cardiomyopathy ([address] 03/15/2022    EF of 35 - 40%    Chronic cerebral ischemia 08/15/2022    Hermelindo Berg DO    Colonoscopy refused 12/01/2016    not this year    Dyslipidemia     Fall at home     Hard palate abscess     Inguinal hernia of left side without obstruction or gangrene 10/05/2021    ct    Lipoma of flank     Prostate CA ([address] 2006    chetan shields md    PSA elevation     S/P colonoscopy 08/11/2006    Sixth nerve palsy of left eye 2022    Rinku Barroso MD -with horizontal diplopia the cause is typically a microvascular infarct of the nerve. Most cases resolve spontaneously within 3 months. We will hold off on neuroimaging at this time. If fails to improve or worsens over the next few weeks will consider neuroimaging refer to PCP to evaluate cardiovascular risk factors and optimize    Toothache      History reviewed. No pertinent surgical history. Allergies   Allergen Reactions    Crestor [Rosuvastatin] Myalgia         REVIEW OF SYSTEMS:  General: negative for - chills or fever  ENT: negative for - headaches, nasal congestion or tinnitus  Respiratory: negative for - cough, hemoptysis, shortness of breath or wheezing  Cardiovascular : negative for - chest pain, edema, palpitations or shortness of breath  Gastrointestinal: negative for - abdominal pain, blood in stools, heartburn or nausea/vomiting  Genito-Urinary: no dysuria, trouble voiding, or hematuria  Musculoskeletal: negative for - gait disturbance, joint pain, joint stiffness or joint swelling  Neurological: no TIA or stroke symptoms  Hematologic: no bruises, no bleeding, no swollen glands  Integument: no lumps, mole changes, nail changes or rash  Endocrine: no malaise/lethargy or unexpected weight changes      Social History     Socioeconomic History    Marital status: SINGLE   Tobacco Use    Smoking status: Never    Smokeless tobacco: Never   Vaping Use    Vaping Use: Never used   Substance and Sexual Activity    Alcohol use: No    Drug use: No    Sexual activity: Yes     Partners: Female   Social History Narrative    Family History: Mother: , natural causesFather: , natural causesSister(s): aliveBrother(s): aliveDaughter(s):aliveGrandmother: Asa Aquas brother(s) , 5 sister(s) - healthy. 1daughter(s) - healthy. Social History: Alcohol Use Patient does not use alcohol.  Smoking Status Patient is a former smoker, Quit in: 36 years ago. Caffeine: none,coffee, per day, soda diet, occasional, tea. Marital Status: Single. Lives w ith: alone w ith grandson. Children: yes, daughters. Occupation/W ork:    retired. History reviewed. No pertinent family history. OBJECTIVE:    Visit Vitals  /72 (BP 1 Location: Left upper arm, BP Patient Position: Sitting)   Pulse 91   Temp 97.9 °F (36.6 °C) (Oral)   Resp 18   Ht 5' 4\" (1.626 m)   Wt 139 lb 14.4 oz (63.5 kg)   SpO2 97%   BMI 24.01 kg/m²     CONSTITUTIONAL: well , well nourished, appears age appropriate  EYES: perrla, eom intact  ENMT:moist mucous membranes, pharynx clear  NECK: supple. Thyroid normal  RESPIRATORY: Chest: clear bilaterally   CARDIOVASCULAR: Heart: regular rate and rhythm  GASTROINTESTINAL: Abdomen: soft, bowel sounds active  HEMATOLOGIC: no pathological lymph nodes palpated  MUSCULOSKELETAL: Extremities: no edema, pulse 1+   INTEGUMENT: No unusual rashes or suspicious skin lesions noted. Nails appear normal.  NEUROLOGIC: non-focal exam   MENTAL STATUS: alert and oriented, appropriate affect           ASSESSMENT:  1. Medicare annual wellness visit, subsequent    2. Screening for alcoholism    3. Dyslipidemia    4. Sixth nerve palsy of left eye    5. Prostate CA ([address]    6. Chronic cerebral ischemia      Dyslipidemia is concerning. He is intolerant of Rosuvastatin and we will try Atorvastatin. He will send us a MyChart note if he also has issues with that. Double vision is significantly improved. He still has it somewhat depending on how he turns his eyes. Prostate cancer remains on remission. Chronic cerebral ischemia according to the neurologist and therefore it is important that we get his cholesterol down to 70 or less. We will try a different statin today with Lipitor. He will be back to see us in four months, sooner if he has any problems.   He will send me a MyChart note if he has challenges with Lipitor in which case we will try a different agent. I have discussed the diagnosis with the patient and the intended plan as seen in the  Orders. The patient understands and agees with the plan. The patient has   received an after visit summary and questions were answered concerning  future plans  Patient labs and/or xrays were reviewed  Past records were reviewed. PLAN:  .  Orders Placed This Encounter    LIPID PANEL    atorvastatin (LIPITOR) 10 mg tablet       Follow-up and Dispositions    Return in about 4 months (around 12/25/2022). ATTENTION:   This medical record was transcribed using an electronic medical records system. Although proofread, it may and can contain electronic and spelling errors. Other human spelling and other errors may be present. Corrections may be executed at a later time. Please feel free to contact us for any clarifications as needed.

## 2022-08-25 NOTE — PATIENT INSTRUCTIONS
Medicare Wellness Visit, Male    The best way to live healthy is to have a lifestyle where you eat a well-balanced diet, exercise regularly, limit alcohol use, and quit all forms of tobacco/nicotine, if applicable. Regular preventive services are another way to keep healthy. Preventive services (vaccines, screening tests, monitoring & exams) can help personalize your care plan, which helps you manage your own care. Screening tests can find health problems at the earliest stages, when they are easiest to treat. [facility] follows the current, evidence-based guidelines published by the [facility] Masoud Nolvia ([facility]) when recommending preventive services for our patients. Because we follow these guidelines, sometimes recommendations change over time as research supports it. (For example, a prostate screening blood test is no longer routinely recommended for men with no symptoms). Of course, you and your doctor may decide to screen more often for some diseases, based on your risk and co-morbidities (chronic disease you are already diagnosed with). Preventive services for you include:  - Medicare offers their members a free annual wellness visit, which is time for you and your primary care provider to discuss and plan for your preventive service needs. Take advantage of this benefit every year!  -All adults over age 72 should receive the recommended pneumonia vaccines. Current USPSTF guidelines recommend a series of two vaccines for the best pneumonia protection.   -All adults should have a flu vaccine yearly and tetanus vaccine every 10 years.  -All adults age 48 and older should receive the shingles vaccines (series of two vaccines).        -All adults age 38-68 who are overweight should have a diabetes screening test once every three years.   -Other screening tests & preventive services for persons with diabetes include: an eye exam to screen for diabetic retinopathy, a kidney function test, a foot exam, and stricter control over your cholesterol.   -Cardiovascular screening for adults with routine risk involves an electrocardiogram (ECG) at intervals determined by the provider.   -Colorectal cancer screening should be done for adults age 54-65 with no increased risk factors for colorectal cancer. There are a number of acceptable methods of screening for this type of cancer. Each test has its own benefits and drawbacks. Discuss with your provider what is most appropriate for you during your annual wellness visit. The different tests include: colonoscopy (considered the best screening method), a fecal occult blood test, a fecal DNA test, and sigmoidoscopy.  -All adults born between [facility] and [facility] should be screened once for Hepatitis C.  -An Abdominal Aortic Aneurysm (AAA) Screening is recommended for men age 73-68 who has ever smoked in their lifetime.      Here is a list of your current Health Maintenance items (your personalized list of preventive services) with a due date:  Health Maintenance Due   Topic Date Due    Pneumococcal Vaccine (2 - PCV) 02/04/2021    COVID-19 Vaccine (4 - Booster for Pfizer series) 04/12/2022

## 2022-08-25 NOTE — PROGRESS NOTES
Jerry Fontana is a [de-identified] y.o. male    Chief Complaint   Patient presents with    Cholesterol Problem     1. Have you been to the ER, urgent care clinic since your last visit? Hospitalized since your last visit? No    2. Have you seen or consulted any other health care providers outside of the [address] [place] [address] since your last visit? Include any pap smears or colon screening. No  This is the Subsequent Medicare Annual Wellness Exam, performed 12 months or more after the Initial AWV or the last Subsequent AWV    I have reviewed the patient's medical history in detail and updated the computerized patient record. Assessment/Plan   Education and counseling provided:  Are appropriate based on today's review and evaluation    1. Screening for alcoholism  2.  Medicare annual wellness visit, subsequent     Depression Risk Factor Screening     3 most recent PHQ Screens 8/25/2022   PHQ Not Done -   Little interest or pleasure in doing things Not at all   Feeling down, depressed, irritable, or hopeless Not at all   Total Score PHQ 2 0   Trouble falling or staying asleep, or sleeping too much Not at all   Feeling tired or having little energy Not at all   Poor appetite, weight loss, or overeating Not at all   Feeling bad about yourself - or that you are a failure or have let yourself or your family down Not at all   Trouble concentrating on things such as school, work, reading, or watching TV Not at all   Moving or speaking so slowly that other people could have noticed; or the opposite being so fidgety that others notice Not at all   Thoughts of being better off dead, or hurting yourself in some way Not at all   PHQ 9 Score 0   How difficult have these problems made it for you to do your work, take care of your home and get along with others Not difficult at all       Alcohol & Drug Abuse Risk Screen    Do you average more than 1 drink per night or more than 7 drinks a week: No    In the past three months have you have had more than 4 drinks containing alcohol on one occasion: No          Functional Ability and Level of Safety    Hearing: Hearing is good. Activities of Daily Living: The home contains: no safety equipment. Patient does total self care      Ambulation: with no difficulty     Fall Risk:  Fall Risk Assessment, last 12 mths 8/25/2022   Able to walk? Yes   Fall in past 12 months? 0   Do you feel unsteady?  0   Are you worried about falling 0      Abuse Screen:  Patient is not abused       Cognitive Screening    Has your family/caregiver stated any concerns about your memory: no     Cognitive Screening: Normal - Verbal Fluency Test    Health Maintenance Due     Health Maintenance Due   Topic Date Due    Pneumococcal 65+ years (2 - PCV) 02/04/2021    COVID-19 Vaccine (4 - Booster for Hernandez Peter series) 04/12/2022       Patient Care Team   Patient Care Team:  Racheal Cárdenas MD as PCP - General (Internal Medicine Physician)  Racheal Cárdenas MD as PCP - [facility] Empaneled Provider    History     Patient Active Problem List   Diagnosis Code    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    Colonoscopy refused Z53.20    Asthmatic bronchitis J45.909    H/O prostate cancer Z85.46    Other chest pain R07.89    Enteritis K52.9    Prostate CA ([address] C61    Inguinal hernia of left side without obstruction or gangrene K40.90    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    Numbness R20.0    Cardiomyopathy ([address] I42.9     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    Cancer of prostate w/high recur risk (T3a or [place] 8-10 or PSA>20)     Cardiomyopathy ([address] 03/15/2022    EF of 35 - 40%    Colonoscopy refused 12/01/2016    not this year    Fall at home     Hard palate abscess     Inguinal hernia of left side without obstruction or gangrene 10/05/2021    ct    Lipoma of flank     Prostate CA ([address] 2006    chetan shields md    PSA elevation     S/P colonoscopy 8-11-06    Sixth nerve palsy of left eye 02/04/2022    Bear Ugalde MD -with horizontal diplopia the cause is typically a microvascular infarct of the nerve. Most cases resolve spontaneously within 3 months. We will hold off on neuroimaging at this time. If fails to improve or worsens over the next few weeks will consider neuroimaging refer to PCP to evaluate cardiovascular risk factors and optimize    Toothache       History reviewed. No pertinent surgical history. Current Outpatient Medications   Medication Sig Dispense Refill    rosuvastatin (CRESTOR) 20 mg tablet Take 1 Tablet by mouth nightly. 30 Tablet 11    aspirin delayed-release 81 mg tablet Take 1 Tablet by mouth daily. 90 Tablet 3    albuterol (PROVENTIL VENTOLIN) 2.5 mg /3 mL (0.083 %) nebu 3 ML BY NEBULIZATION ROUTE EVERY FOUR (4) HOURS AS NEEDED FOR WHEEZING. 150 mL 11     No Known Allergies    History reviewed. No pertinent family history.   Social History     Tobacco Use    Smoking status: Never    Smokeless tobacco: Never   Substance Use Topics    Alcohol use: No         Krystle Vasquez MA

## 2022-08-26 NOTE — PROGRESS NOTES
Cholesterol remains elevated. If you can tolerate the Lipitor or atorvastatin we will increase the dose gradually.   If you cannot let me know as we can try something different

## 2022-12-20 RX ORDER — ALBUTEROL SULFATE 0.83 MG/ML
2.5 SOLUTION RESPIRATORY (INHALATION)
Qty: 150 ML | Refills: 11 | Status: SHIPPED | OUTPATIENT
Start: 2022-12-20

## 2022-12-29 ENCOUNTER — OFFICE VISIT (OUTPATIENT)
Dept: INTERNAL MEDICINE CLINIC | Age: 82
End: 2022-12-29
Payer: MEDICARE

## 2022-12-29 VITALS
TEMPERATURE: 98.6 F | OXYGEN SATURATION: 99 % | BODY MASS INDEX: 24.45 KG/M2 | HEART RATE: 99 BPM | RESPIRATION RATE: 16 BRPM | WEIGHT: 143.2 LBS | HEIGHT: 64 IN | DIASTOLIC BLOOD PRESSURE: 78 MMHG | SYSTOLIC BLOOD PRESSURE: 117 MMHG

## 2022-12-29 DIAGNOSIS — I67.82 CHRONIC CEREBRAL ISCHEMIA: ICD-10-CM

## 2022-12-29 DIAGNOSIS — D17.1 LIPOMA OF FLANK: ICD-10-CM

## 2022-12-29 DIAGNOSIS — E78.5 DYSLIPIDEMIA: Primary | ICD-10-CM

## 2022-12-29 DIAGNOSIS — C61 PROSTATE CA (HCC): ICD-10-CM

## 2022-12-29 DIAGNOSIS — I42.9 CARDIOMYOPATHY, UNSPECIFIED TYPE (HCC): ICD-10-CM

## 2022-12-29 LAB
CHOLEST SERPL-MCNC: 221 MG/DL
HDLC SERPL-MCNC: 82 MG/DL
HDLC SERPL: 2.7 {RATIO} (ref 0–5)
LDLC SERPL CALC-MCNC: 125.4 MG/DL (ref 0–100)
TRIGL SERPL-MCNC: 68 MG/DL (ref ?–150)
VLDLC SERPL CALC-MCNC: 13.6 MG/DL

## 2022-12-29 RX ORDER — ATORVASTATIN CALCIUM 40 MG/1
40 TABLET, FILM COATED ORAL DAILY
Qty: 90 TABLET | Refills: 3 | Status: SHIPPED | OUTPATIENT
Start: 2022-12-29

## 2022-12-29 NOTE — PROGRESS NOTES
Identified pt with two pt identifiers(name and ). Reviewed record in preparation for visit and have obtained necessary documentation. Chief Complaint   Patient presents with    Cholesterol Problem        Health Maintenance Due   Topic    Pneumococcal 65+ years (1 - PCV)    COVID-19 Vaccine (4 - Booster for Hernandez Peter series)    Flu Vaccine (1)      Visit Vitals  /78 (BP 1 Location: Left upper arm, BP Patient Position: Sitting, BP Cuff Size: Adult)   Pulse 99   Temp 98.6 °F (37 °C) (Oral)   Resp 16   Ht 5' 4\" (1.626 m)   Wt 143 lb 3.2 oz (65 kg)   SpO2 99%   BMI 24.58 kg/m²     Pain Scale: 0 - No pain/10    Coordination of Care Questionnaire:  :   1. Have you been to the ER, urgent care clinic since your last visit? Hospitalized since your last visit? No    2. Have you seen or consulted any other health care providers outside of the [address] [place] [address] since your last visit? Include any pap smears or colon screening.  No

## 2022-12-29 NOTE — PROGRESS NOTES
SPORTS MEDICINE AND PRIMARY CARE  Kwasi Alvarez MD, [address] [address]  Phone:  [phone]  Fax: [phone]       Chief Complaint   Patient presents with    Cholesterol Problem   . SUBJECTIVE:    Albaro Chavis is a [de-identified] y.o. male Patient returns today with a known history of dyslipidemia, cardiomyopathy, prostate cancer, lipoma of the flank, chronic cerebral ischemia, and is seen for evaluation. Patient voices no new complaints. His grandson is really doing good, he states. Care gaps are reviewed. Current Outpatient Medications   Medication Sig Dispense Refill    albuterol (PROVENTIL VENTOLIN) 2.5 mg /3 mL (0.083 %) nebu 3 ML BY NEBULIZATION ROUTE EVERY FOUR (4) HOURS AS NEEDED FOR WHEEZING. 150 mL 11    atorvastatin (LIPITOR) 10 mg tablet Take 1 Tablet by mouth daily. 30 Tablet 11    aspirin delayed-release 81 mg tablet Take 1 Tablet by mouth daily. 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    Cancer of prostate w/high recur risk (T3a or [place] 8-10 or PSA>20)     Cardiomyopathy ([facility] 03/15/2022    EF of 35 - 40%    Chronic cerebral ischemia 08/15/2022    Hermelindo Berg DO    Colonoscopy refused 12/01/2016    not this year    Dyslipidemia     Fall at home     Hard palate abscess     Inguinal hernia of left side without obstruction or gangrene 10/05/2021    ct    Lipoma of flank     Prostate CA ([facility] 2006    chetan shields md    PSA elevation     S/P colonoscopy 08/11/2006    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 Most cases resolve spontaneously within 3 months. We will hold off on neuroimaging at this time.   If fails to improve or worsens over the next few weeks will consider neuroimaging refer to PCP to evaluate cardiovascular risk factors and optimize Toothache      No past surgical history on file. Allergies   Allergen Reactions    Crestor [Rosuvastatin] Myalgia         REVIEW OF SYSTEMS:  General: negative for - chills or fever  ENT: negative for - headaches, nasal congestion or tinnitus  Respiratory: negative for - cough, hemoptysis, shortness of breath or wheezing  Cardiovascular : negative for - chest pain, edema, palpitations or shortness of breath  Gastrointestinal: negative for - abdominal pain, blood in stools, heartburn or nausea/vomiting  Genito-Urinary: no dysuria, trouble voiding, or hematuria  Musculoskeletal: negative for - gait disturbance, joint pain, joint stiffness or joint swelling  Neurological: no TIA or stroke symptoms  Hematologic: no bruises, no bleeding, no swollen glands  Integument: no lumps, mole changes, nail changes or rash  Endocrine: no malaise/lethargy or unexpected weight changes      Social History     Socioeconomic History    Marital status: SINGLE   Tobacco Use    Smoking status: Never    Smokeless tobacco: Never   Vaping Use    Vaping Use: Never used   Substance and Sexual Activity    Alcohol use: No    Drug use: No    Sexual activity: Yes     Partners: Female   Social History Narrative    Family History: Mother: , natural causesFather: , natural causesSister(s): aliveBrother(s): aliveDaughter(s):aliveGrandmother: Denise Da Silva brother(s) , 5 sister(s) - healthy. 1daughter(s) - healthy. Social History: Alcohol Use Patient does not use alcohol. Smoking Status Patient is a former smoker, Quit in: 40 years ago. Caffeine: none,coffee, per day, soda diet, occasional, tea. Marital Status: Single. Lives w ith: alone w ith grandson. Children: yes, daughters. Occupation/W ork:    retired. No family history on file.     OBJECTIVE:    Visit Vitals  /78 (BP 1 Location: Left upper arm, BP Patient Position: Sitting, BP Cuff Size: Adult)   Pulse 99   Temp 98.6 °F (37 °C) (Oral)   Resp 16   Ht 5' 4\" (1.626 m)   Wt 143 lb 3.2 oz (65 kg)   SpO2 99%   BMI 24.58 kg/m²     CONSTITUTIONAL: well , well nourished, appears age appropriate  EYES: perrla, eom intact  ENMT:moist mucous membranes, pharynx clear  NECK: supple. Thyroid normal  RESPIRATORY: Chest: clear bilaterally   CARDIOVASCULAR: Heart: regular rate and rhythm  GASTROINTESTINAL: Abdomen: soft, bowel sounds active  HEMATOLOGIC: no pathological lymph nodes palpated  MUSCULOSKELETAL: Extremities: no edema, pulse 1+   INTEGUMENT: No unusual rashes or suspicious skin lesions noted. Nails appear normal.  NEUROLOGIC: non-focal exam   MENTAL STATUS: alert and oriented, appropriate affect           ASSESSMENT:  1. Dyslipidemia    2. Cardiomyopathy, unspecified type ([address]    3. Prostate CA ([address]    4. Lipoma of flank    5. Chronic cerebral ischemia      Patient states he does not take his cholesterol pill on a regular basis, has been off the diet a little bit this month also. History of cardiomyopathy. Patient's medical status is stable. Prostate cancer is followed by urology and to our knowledge remains in remission. He wonders about the lipoma in the flank area. We give him some information to review. We suggest he continue to observe it. Chronic cerebral ischemia was noted by neurology. He has no new neurologic deficits. He will be back to see me in four to six months, sooner if needed. I have discussed the diagnosis with the patient and the intended plan as seen in the  Orders. The patient understands and agees with the plan. The patient has   received an after visit summary and questions were answered concerning  future plans  Patient labs and/or xrays were reviewed  Past records were reviewed. PLAN:  .  Orders Placed This Encounter    LIPID PANEL       Follow-up and Dispositions    Return in about 4 months (around 4/29/2023). ATTENTION:   This medical record was transcribed using an electronic medical records system. Although proofread, it may and can contain electronic and spelling errors. Other human spelling and other errors may be present. Corrections may be executed at a later time. Please feel free to contact us for any clarifications as needed.

## 2023-03-20 RX ORDER — ALBUTEROL SULFATE 0.83 MG/ML
2.5 SOLUTION RESPIRATORY (INHALATION)
Qty: 150 ML | Refills: 11 | Status: SHIPPED | OUTPATIENT
Start: 2023-03-20

## 2023-05-02 ENCOUNTER — OFFICE VISIT (OUTPATIENT)
Dept: INTERNAL MEDICINE CLINIC | Age: 83
End: 2023-05-02
Payer: MEDICARE

## 2023-05-02 VITALS
RESPIRATION RATE: 18 BRPM | HEART RATE: 80 BPM | TEMPERATURE: 98 F | DIASTOLIC BLOOD PRESSURE: 80 MMHG | WEIGHT: 142.2 LBS | SYSTOLIC BLOOD PRESSURE: 128 MMHG | HEIGHT: 64 IN | OXYGEN SATURATION: 97 % | BODY MASS INDEX: 24.28 KG/M2

## 2023-05-02 DIAGNOSIS — I42.9 CARDIOMYOPATHY, UNSPECIFIED TYPE (HCC): ICD-10-CM

## 2023-05-02 DIAGNOSIS — I67.82 CHRONIC CEREBRAL ISCHEMIA: ICD-10-CM

## 2023-05-02 DIAGNOSIS — E78.5 DYSLIPIDEMIA: Primary | ICD-10-CM

## 2023-05-02 DIAGNOSIS — D17.1 LIPOMA OF FLANK: ICD-10-CM

## 2023-05-02 DIAGNOSIS — C61 PROSTATE CA (HCC): ICD-10-CM

## 2023-05-02 PROCEDURE — G8427 DOCREV CUR MEDS BY ELIG CLIN: HCPCS | Performed by: INTERNAL MEDICINE

## 2023-05-02 PROCEDURE — G8536 NO DOC ELDER MAL SCRN: HCPCS | Performed by: INTERNAL MEDICINE

## 2023-05-02 PROCEDURE — 1101F PT FALLS ASSESS-DOCD LE1/YR: CPT | Performed by: INTERNAL MEDICINE

## 2023-05-02 PROCEDURE — 99214 OFFICE O/P EST MOD 30 MIN: CPT | Performed by: INTERNAL MEDICINE

## 2023-05-02 PROCEDURE — G8432 DEP SCR NOT DOC, RNG: HCPCS | Performed by: INTERNAL MEDICINE

## 2023-05-02 PROCEDURE — G8420 CALC BMI NORM PARAMETERS: HCPCS | Performed by: INTERNAL MEDICINE

## 2023-05-02 PROCEDURE — 1123F ACP DISCUSS/DSCN MKR DOCD: CPT | Performed by: INTERNAL MEDICINE

## 2023-08-02 ENCOUNTER — OFFICE VISIT (OUTPATIENT)
Facility: CLINIC | Age: 83
End: 2023-08-02
Payer: MEDICARE

## 2023-08-02 VITALS
SYSTOLIC BLOOD PRESSURE: 119 MMHG | BODY MASS INDEX: 23.9 KG/M2 | TEMPERATURE: 97.6 F | HEIGHT: 64 IN | WEIGHT: 140 LBS | OXYGEN SATURATION: 95 % | RESPIRATION RATE: 16 BRPM | HEART RATE: 79 BPM | DIASTOLIC BLOOD PRESSURE: 77 MMHG

## 2023-08-02 DIAGNOSIS — I50.22 CHRONIC SYSTOLIC (CONGESTIVE) HEART FAILURE (HCC): ICD-10-CM

## 2023-08-02 DIAGNOSIS — R06.02 SOB (SHORTNESS OF BREATH): ICD-10-CM

## 2023-08-02 DIAGNOSIS — Z12.11 SCREEN FOR COLON CANCER: ICD-10-CM

## 2023-08-02 DIAGNOSIS — I42.9 CARDIOMYOPATHY, UNSPECIFIED TYPE (HCC): ICD-10-CM

## 2023-08-02 DIAGNOSIS — C61 PROSTATE CA (HCC): ICD-10-CM

## 2023-08-02 DIAGNOSIS — I67.82 CHRONIC CEREBRAL ISCHEMIA: ICD-10-CM

## 2023-08-02 DIAGNOSIS — E78.5 DYSLIPIDEMIA: Primary | ICD-10-CM

## 2023-08-02 PROCEDURE — 1036F TOBACCO NON-USER: CPT | Performed by: INTERNAL MEDICINE

## 2023-08-02 PROCEDURE — 36415 COLL VENOUS BLD VENIPUNCTURE: CPT | Performed by: INTERNAL MEDICINE

## 2023-08-02 PROCEDURE — 99214 OFFICE O/P EST MOD 30 MIN: CPT | Performed by: INTERNAL MEDICINE

## 2023-08-02 PROCEDURE — G8427 DOCREV CUR MEDS BY ELIG CLIN: HCPCS | Performed by: INTERNAL MEDICINE

## 2023-08-02 PROCEDURE — G8420 CALC BMI NORM PARAMETERS: HCPCS | Performed by: INTERNAL MEDICINE

## 2023-08-02 PROCEDURE — 1123F ACP DISCUSS/DSCN MKR DOCD: CPT | Performed by: INTERNAL MEDICINE

## 2023-08-02 ASSESSMENT — PATIENT HEALTH QUESTIONNAIRE - PHQ9
SUM OF ALL RESPONSES TO PHQ QUESTIONS 1-9: 0
2. FEELING DOWN, DEPRESSED OR HOPELESS: 0
SUM OF ALL RESPONSES TO PHQ9 QUESTIONS 1 & 2: 0
SUM OF ALL RESPONSES TO PHQ QUESTIONS 1-9: 0
1. LITTLE INTEREST OR PLEASURE IN DOING THINGS: 0

## 2023-08-02 NOTE — PROGRESS NOTES
SPORTS MEDICINE AND PRIMARY CARE  Sagrario Carroll MD, [place], [address] [place] [address]  Phone:  [phone]  Fax: [phone]       Chief Complaint   Patient presents with    Cholesterol Problem   . SUBJECTIVE:    Red Barboza is a 82 y.o. male . .. [clipped] acts like \"a little child. \"  He has a known history of dyslipidemia, chronic systolic congestive heart failure, cerebral ischemia, prostate cancer, cardiomyopathy and is seen for evaluation. Current Outpatient Medications   Medication Sig Dispense Refill    albuterol (PROVENTIL) (2.5 MG/3ML) 0.083% nebulizer solution Inhale into the lungs every 4 hours as needed      aspirin 81 MG EC tablet Take by mouth daily      atorvastatin (LIPITOR) 40 MG tablet Take by mouth daily       No current facility-administered medications for this visit. 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    Cancer of prostate w/high recur risk (T3a or [place] 8-10 or PSA>20)     Cardiomyopathy ([address]) 03/15/2022    EF of 35 - 40%    Chronic cerebral ischemia 08/15/2022    Cayden Chang DO    Colonoscopy refused 12/01/2016    not this year    Dyslipidemia     Fall at home     Hard palate abscess     Inguinal hernia of left side without obstruction or gangrene 10/05/2021    ct    Lipoma of flank     Prostate CA ([address]) 2006    jalil patel md    PSA elevation     S/P colonoscopy 08/11/2006    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 Most cases resolve spontaneously within 3 months. We will hold off on neuroimaging at this time. If fails to improve or worsens over the next few weeks will consider neuroimaging refer to PCP to evaluate cardiovascular risk factors and optimize    Toothache      History reviewed. No pertinent surgical history.   Allergies   Allergen Reactions

## 2023-08-03 DIAGNOSIS — C61 PROSTATE CA (HCC): Primary | ICD-10-CM

## 2023-08-03 LAB
ALBUMIN SERPL-MCNC: 3.7 G/DL (ref 3.5–5)
ALBUMIN/GLOB SERPL: 1 (ref 1.1–2.2)
ALP SERPL-CCNC: 83 U/L (ref 45–117)
ALT SERPL-CCNC: 16 U/L (ref 12–78)
ANION GAP SERPL CALC-SCNC: 5 MMOL/L (ref 5–15)
APPEARANCE UR: CLEAR
AST SERPL-CCNC: 22 U/L (ref 15–37)
BACTERIA URNS QL MICRO: NEGATIVE /HPF
BASOPHILS # BLD: 0.1 K/UL (ref 0–0.1)
BASOPHILS NFR BLD: 2 % (ref 0–1)
BILIRUB SERPL-MCNC: 1 MG/DL (ref 0.2–1)
BILIRUB UR QL: NEGATIVE
BUN SERPL-MCNC: 18 MG/DL (ref 6–20)
BUN/CREAT SERPL: 16 (ref 12–20)
CALCIUM SERPL-MCNC: 9 MG/DL (ref 8.5–10.1)
CHLORIDE SERPL-SCNC: 107 MMOL/L (ref 97–108)
CHOLEST SERPL-MCNC: 163 MG/DL
CO2 SERPL-SCNC: 29 MMOL/L (ref 21–32)
COLOR UR: NORMAL
COMMENT:: NORMAL
CREAT SERPL-MCNC: 1.15 MG/DL (ref 0.7–1.3)
DIFFERENTIAL METHOD BLD: ABNORMAL
EOSINOPHIL # BLD: 0.1 K/UL (ref 0–0.4)
EOSINOPHIL NFR BLD: 2 % (ref 0–7)
EPITH CASTS URNS QL MICRO: NORMAL /LPF
ERYTHROCYTE [DISTWIDTH] IN BLOOD BY AUTOMATED COUNT: 11.5 % (ref 11.5–14.5)
GLOBULIN SER CALC-MCNC: 3.7 G/DL (ref 2–4)
GLUCOSE SERPL-MCNC: 83 MG/DL (ref 65–100)
GLUCOSE UR STRIP.AUTO-MCNC: NEGATIVE MG/DL
HCT VFR BLD AUTO: 45.5 % (ref 36.6–50.3)
HDLC SERPL-MCNC: 64 MG/DL
HDLC SERPL: 2.5 (ref 0–5)
HGB BLD-MCNC: 14 G/DL (ref 12.1–17)
HGB UR QL STRIP: NEGATIVE
HYALINE CASTS URNS QL MICRO: NORMAL /LPF (ref 0–5)
IMM GRANULOCYTES # BLD AUTO: 0 K/UL (ref 0–0.04)
IMM GRANULOCYTES NFR BLD AUTO: 0 % (ref 0–0.5)
KETONES UR QL STRIP.AUTO: NEGATIVE MG/DL
LDLC SERPL CALC-MCNC: 88.6 MG/DL (ref 0–100)
LEUKOCYTE ESTERASE UR QL STRIP.AUTO: NEGATIVE
LYMPHOCYTES # BLD: 2.4 K/UL (ref 0.8–3.5)
LYMPHOCYTES NFR BLD: 47 % (ref 12–49)
MCH RBC QN AUTO: 33.7 PG (ref 26–34)
MCHC RBC AUTO-ENTMCNC: 30.8 G/DL (ref 30–36.5)
MCV RBC AUTO: 109.4 FL (ref 80–99)
MONOCYTES # BLD: 0.6 K/UL (ref 0–1)
MONOCYTES NFR BLD: 12 % (ref 5–13)
NEUTS SEG # BLD: 1.9 K/UL (ref 1.8–8)
NEUTS SEG NFR BLD: 37 % (ref 32–75)
NITRITE UR QL STRIP.AUTO: NEGATIVE
NRBC # BLD: 0 K/UL (ref 0–0.01)
NRBC BLD-RTO: 0 PER 100 WBC
NT PRO BNP: 130 PG/ML
PH UR STRIP: 5.5 (ref 5–8)
PLATELET # BLD AUTO: 266 K/UL (ref 150–400)
PMV BLD AUTO: 10.4 FL (ref 8.9–12.9)
POTASSIUM SERPL-SCNC: 4.4 MMOL/L (ref 3.5–5.1)
PROT SERPL-MCNC: 7.4 G/DL (ref 6.4–8.2)
PROT UR STRIP-MCNC: NEGATIVE MG/DL
PSA SERPL-MCNC: 6.1 NG/ML (ref 0.01–4)
RBC # BLD AUTO: 4.16 M/UL (ref 4.1–5.7)
RBC #/AREA URNS HPF: NORMAL /HPF (ref 0–5)
RBC MORPH BLD: ABNORMAL
SODIUM SERPL-SCNC: 141 MMOL/L (ref 136–145)
SP GR UR REFRACTOMETRY: 1.02 (ref 1–1.03)
SPECIMEN HOLD: NORMAL
TRIGL SERPL-MCNC: 52 MG/DL
UROBILINOGEN UR QL STRIP.AUTO: 0.2 EU/DL (ref 0.2–1)
VLDLC SERPL CALC-MCNC: 10.4 MG/DL
WBC # BLD AUTO: 5.1 K/UL (ref 4.1–11.1)
WBC URNS QL MICRO: NORMAL /HPF (ref 0–4)

## 2023-10-17 PROBLEM — Z12.11 SCREEN FOR COLON CANCER: Status: ACTIVE | Noted: 2023-10-17

## 2023-10-17 LAB — NONINV COLON CA DNA+OCC BLD SCRN STL QL: NEGATIVE

## 2023-11-16 PROBLEM — Z12.11 SCREEN FOR COLON CANCER: Status: RESOLVED | Noted: 2023-10-17 | Resolved: 2023-11-16

## 2023-12-06 ENCOUNTER — OFFICE VISIT (OUTPATIENT)
Facility: CLINIC | Age: 83
End: 2023-12-06
Payer: MEDICARE

## 2023-12-06 VITALS
OXYGEN SATURATION: 99 % | HEART RATE: 85 BPM | SYSTOLIC BLOOD PRESSURE: 134 MMHG | TEMPERATURE: 97.6 F | BODY MASS INDEX: 24.48 KG/M2 | WEIGHT: 143.4 LBS | DIASTOLIC BLOOD PRESSURE: 86 MMHG | RESPIRATION RATE: 16 BRPM | HEIGHT: 64 IN

## 2023-12-06 DIAGNOSIS — Z13.88 SCREENING FOR POISONING FROM CONTAMINATED WATER: ICD-10-CM

## 2023-12-06 DIAGNOSIS — I42.9 CARDIOMYOPATHY, UNSPECIFIED TYPE (HCC): ICD-10-CM

## 2023-12-06 DIAGNOSIS — I50.22 CHRONIC SYSTOLIC (CONGESTIVE) HEART FAILURE (HCC): ICD-10-CM

## 2023-12-06 DIAGNOSIS — K40.90 INGUINAL HERNIA OF LEFT SIDE WITHOUT OBSTRUCTION OR GANGRENE: ICD-10-CM

## 2023-12-06 DIAGNOSIS — Z00.00 MEDICARE ANNUAL WELLNESS VISIT, SUBSEQUENT: Primary | ICD-10-CM

## 2023-12-06 DIAGNOSIS — C61 PROSTATE CA (HCC): ICD-10-CM

## 2023-12-06 DIAGNOSIS — E78.5 DYSLIPIDEMIA: ICD-10-CM

## 2023-12-06 PROBLEM — H49.22 SIXTH NERVE PALSY OF LEFT EYE: Status: RESOLVED | Noted: 2022-02-04 | Resolved: 2023-12-06

## 2023-12-06 PROBLEM — K52.9 ENTERITIS: Status: RESOLVED | Noted: 2021-10-07 | Resolved: 2023-12-06

## 2023-12-06 PROBLEM — R07.89 OTHER CHEST PAIN: Status: RESOLVED | Noted: 2021-04-01 | Resolved: 2023-12-06

## 2023-12-06 PROBLEM — R20.0 NUMBNESS: Status: RESOLVED | Noted: 2022-02-22 | Resolved: 2023-12-06

## 2023-12-06 PROCEDURE — 1036F TOBACCO NON-USER: CPT | Performed by: INTERNAL MEDICINE

## 2023-12-06 PROCEDURE — 99214 OFFICE O/P EST MOD 30 MIN: CPT | Performed by: INTERNAL MEDICINE

## 2023-12-06 PROCEDURE — G8427 DOCREV CUR MEDS BY ELIG CLIN: HCPCS | Performed by: INTERNAL MEDICINE

## 2023-12-06 PROCEDURE — G8484 FLU IMMUNIZE NO ADMIN: HCPCS | Performed by: INTERNAL MEDICINE

## 2023-12-06 PROCEDURE — G0439 PPPS, SUBSEQ VISIT: HCPCS | Performed by: INTERNAL MEDICINE

## 2023-12-06 PROCEDURE — 1123F ACP DISCUSS/DSCN MKR DOCD: CPT | Performed by: INTERNAL MEDICINE

## 2023-12-06 PROCEDURE — G8420 CALC BMI NORM PARAMETERS: HCPCS | Performed by: INTERNAL MEDICINE

## 2023-12-06 SDOH — ECONOMIC STABILITY: TRANSPORTATION INSECURITY
IN THE PAST 12 MONTHS, HAS THE LACK OF TRANSPORTATION KEPT YOU FROM MEDICAL APPOINTMENTS OR FROM GETTING MEDICATIONS?: NO

## 2023-12-06 SDOH — ECONOMIC STABILITY: FOOD INSECURITY: WITHIN THE PAST 12 MONTHS, YOU WORRIED THAT YOUR FOOD WOULD RUN OUT BEFORE YOU GOT MONEY TO BUY MORE.: NEVER TRUE

## 2023-12-06 SDOH — ECONOMIC STABILITY: INCOME INSECURITY: IN THE LAST 12 MONTHS, WAS THERE A TIME WHEN YOU WERE NOT ABLE TO PAY THE MORTGAGE OR RENT ON TIME?: NO

## 2023-12-06 SDOH — ECONOMIC STABILITY: HOUSING INSECURITY
IN THE LAST 12 MONTHS, WAS THERE A TIME WHEN YOU DID NOT HAVE A STEADY PLACE TO SLEEP OR SLEPT IN A SHELTER (INCLUDING NOW)?: NO

## 2023-12-06 SDOH — ECONOMIC STABILITY: TRANSPORTATION INSECURITY
IN THE PAST 12 MONTHS, HAS LACK OF TRANSPORTATION KEPT YOU FROM MEETINGS, WORK, OR FROM GETTING THINGS NEEDED FOR DAILY LIVING?: NO

## 2023-12-06 SDOH — HEALTH STABILITY: PHYSICAL HEALTH: ON AVERAGE, HOW MANY MINUTES DO YOU ENGAGE IN EXERCISE AT THIS LEVEL?: 0 MIN

## 2023-12-06 SDOH — HEALTH STABILITY: PHYSICAL HEALTH: ON AVERAGE, HOW MANY DAYS PER WEEK DO YOU ENGAGE IN MODERATE TO STRENUOUS EXERCISE (LIKE A BRISK WALK)?: 0 DAYS

## 2023-12-06 SDOH — ECONOMIC STABILITY: FOOD INSECURITY: WITHIN THE PAST 12 MONTHS, THE FOOD YOU BOUGHT JUST DIDN'T LAST AND YOU DIDN'T HAVE MONEY TO GET MORE.: NEVER TRUE

## 2023-12-06 SDOH — ECONOMIC STABILITY: HOUSING INSECURITY: IN THE LAST 12 MONTHS, HOW MANY PLACES HAVE YOU LIVED?: 1

## 2023-12-06 ASSESSMENT — PATIENT HEALTH QUESTIONNAIRE - PHQ9
2. FEELING DOWN, DEPRESSED OR HOPELESS: 0
SUM OF ALL RESPONSES TO PHQ QUESTIONS 1-9: 0
1. LITTLE INTEREST OR PLEASURE IN DOING THINGS: 0
SUM OF ALL RESPONSES TO PHQ QUESTIONS 1-9: 0
SUM OF ALL RESPONSES TO PHQ9 QUESTIONS 1 & 2: 0

## 2023-12-06 ASSESSMENT — ANXIETY QUESTIONNAIRES
7. FEELING AFRAID AS IF SOMETHING AWFUL MIGHT HAPPEN: 0
6. BECOMING EASILY ANNOYED OR IRRITABLE: 0
4. TROUBLE RELAXING: 0
IF YOU CHECKED OFF ANY PROBLEMS ON THIS QUESTIONNAIRE, HOW DIFFICULT HAVE THESE PROBLEMS MADE IT FOR YOU TO DO YOUR WORK, TAKE CARE OF THINGS AT HOME, OR GET ALONG WITH OTHER PEOPLE: NOT DIFFICULT AT ALL
1. FEELING NERVOUS, ANXIOUS, OR ON EDGE: 0
5. BEING SO RESTLESS THAT IT IS HARD TO SIT STILL: 0
2. NOT BEING ABLE TO STOP OR CONTROL WORRYING: 0
GAD7 TOTAL SCORE: 0
3. WORRYING TOO MUCH ABOUT DIFFERENT THINGS: 0

## 2023-12-06 ASSESSMENT — SOCIAL DETERMINANTS OF HEALTH (SDOH)
HOW OFTEN DO YOU ATTEND CHURCH OR RELIGIOUS SERVICES?: MORE THAN 4 TIMES PER YEAR
WITHIN THE LAST YEAR, HAVE YOU BEEN AFRAID OF YOUR PARTNER OR EX-PARTNER?: NO
HOW OFTEN DO YOU GET TOGETHER WITH FRIENDS OR RELATIVES?: MORE THAN THREE TIMES A WEEK
HOW OFTEN DO YOU ATTENT MEETINGS OF THE CLUB OR ORGANIZATION YOU BELONG TO?: MORE THAN 4 TIMES PER YEAR
IN A TYPICAL WEEK, HOW MANY TIMES DO YOU TALK ON THE PHONE WITH FAMILY, FRIENDS, OR NEIGHBORS?: MORE THAN THREE TIMES A WEEK
HOW HARD IS IT FOR YOU TO PAY FOR THE VERY BASICS LIKE FOOD, HOUSING, MEDICAL CARE, AND HEATING?: NOT HARD AT ALL
WITHIN THE LAST YEAR, HAVE YOU BEEN KICKED, HIT, SLAPPED, OR OTHERWISE PHYSICALLY HURT BY YOUR PARTNER OR EX-PARTNER?: NO
DO YOU BELONG TO ANY CLUBS OR ORGANIZATIONS SUCH AS CHURCH GROUPS UNIONS, FRATERNAL OR ATHLETIC GROUPS, OR SCHOOL GROUPS?: YES
WITHIN THE LAST YEAR, HAVE YOU BEEN HUMILIATED OR EMOTIONALLY ABUSED IN OTHER WAYS BY YOUR PARTNER OR EX-PARTNER?: NO
WITHIN THE LAST YEAR, HAVE TO BEEN RAPED OR FORCED TO HAVE ANY KIND OF SEXUAL ACTIVITY BY YOUR PARTNER OR EX-PARTNER?: NO

## 2023-12-06 ASSESSMENT — LIFESTYLE VARIABLES
HOW MANY STANDARD DRINKS CONTAINING ALCOHOL DO YOU HAVE ON A TYPICAL DAY: 1 OR 2
HOW OFTEN DO YOU HAVE A DRINK CONTAINING ALCOHOL: MONTHLY OR LESS

## 2023-12-06 NOTE — PROGRESS NOTES
SPORTS MEDICINE AND PRIMARY CARE  Erasmo Herndon MD, [place], [address] [facility] [id number] [place] [address]  Phone:  [phone]  Fax: [phone]       Chief Complaint   Patient presents with    Medicare AWV     Patient here for Annual Medicare Wellness Exam.    .      SUBJECTIVE:    Warden Dang is a 82 y.o. male Patient returns today with a known history of cardiomyopathy, chronic systolic congestive heart failure, lipoma of the flank, left inguinal hernia, dyslipidemia, chronic cerebral ischemia and is seen for evaluation. He saw his urologist, Dr. Cecilia Padilla, whom the patient says did not have any recommendations. Both myself and the [facility] were concerned about his elevated PSA. He has water claim related to [facility] [employer]. Current Outpatient Medications   Medication Sig Dispense Refill    albuterol (PROVENTIL) (2.5 MG/3ML) 0.083% nebulizer solution Inhale into the lungs every 4 hours as needed      aspirin 81 MG EC tablet Take by mouth daily      atorvastatin (LIPITOR) 40 MG tablet Take by mouth daily       No current facility-administered medications for this visit.      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    Cancer of prostate w/high recur risk (T3a or [place] 8-10 or PSA>20)     Cardiomyopathy ([address]) 03/15/2022    EF of 35 - 40%    Chronic cerebral ischemia 08/15/2022    Cayden Chang DO    Colonoscopy refused 12/01/2016    not this year    Dyslipidemia     Fall at home     Hard palate abscess     Inguinal hernia of left side without obstruction or gangrene 10/05/2021    ct    Lipoma of flank     Prostate CA ([address]) 2006    jalil patel md    PSA elevation     S/P colonoscopy 08/11/2006    Screen for colon cancer 10/17/2023    FIT-DNA (Cologuard) Negative Negative    Sixth nerve palsy of left eye 02/04/2022    Carmine Mayen MD -with horizontal diplopia the cause is typically a microvascular infarct

## 2023-12-12 LAB
ALBUMIN SERPL ELPH-MCNC: 3.6 G/DL (ref 2.9–4.4)
ALBUMIN/GLOB SERPL: 1.2 (ref 0.7–1.7)
ALPHA1 GLOB SERPL ELPH-MCNC: 0.2 G/DL (ref 0–0.4)
ALPHA2 GLOB SERPL ELPH-MCNC: 0.6 G/DL (ref 0.4–1)
B-GLOBULIN SERPL ELPH-MCNC: 1.1 G/DL (ref 0.7–1.3)
GAMMA GLOB SERPL ELPH-MCNC: 1.2 G/DL (ref 0.4–1.8)
GLOBULIN SER-MCNC: 3.1 G/DL (ref 2.2–3.9)
IGA SERPL-MCNC: 389 MG/DL (ref 61–437)
IGG SERPL-MCNC: 1215 MG/DL (ref 603–1613)
IGM SERPL-MCNC: 73 MG/DL (ref 15–143)
INTERPRETATION SERPL IEP-IMP: ABNORMAL
KAPPA LC FREE SER-MCNC: 28.3 MG/L (ref 3.3–19.4)
KAPPA LC FREE/LAMBDA FREE SER: 1.75 (ref 0.26–1.65)
LAMBDA LC FREE SERPL-MCNC: 16.2 MG/L (ref 5.7–26.3)
M PROTEIN SERPL ELPH-MCNC: ABNORMAL G/DL
PROT SERPL-MCNC: 6.7 G/DL (ref 6–8.5)

## 2024-03-03 RX ORDER — ATORVASTATIN CALCIUM 40 MG/1
40 TABLET, FILM COATED ORAL DAILY
Qty: 90 TABLET | Refills: 3 | Status: SHIPPED | OUTPATIENT
Start: 2024-03-03

## 2024-03-03 RX ORDER — ATORVASTATIN CALCIUM 10 MG/1
10 TABLET, FILM COATED ORAL DAILY
Qty: 30 TABLET | Refills: 11 | OUTPATIENT
Start: 2024-03-03

## 2024-03-03 RX ORDER — ASPIRIN 81 MG/1
81 TABLET, COATED ORAL DAILY
Qty: 90 TABLET | Refills: 3 | Status: SHIPPED | OUTPATIENT
Start: 2024-03-03

## 2024-03-09 RX ORDER — ATORVASTATIN CALCIUM 10 MG/1
10 TABLET, FILM COATED ORAL DAILY
Qty: 30 TABLET | Refills: 11 | OUTPATIENT
Start: 2024-03-09

## 2024-03-25 RX ORDER — ATORVASTATIN CALCIUM 10 MG/1
10 TABLET, FILM COATED ORAL DAILY
Qty: 90 TABLET | Refills: 3 | Status: SHIPPED | OUTPATIENT
Start: 2024-03-25

## 2024-04-24 ENCOUNTER — OFFICE VISIT (OUTPATIENT)
Facility: CLINIC | Age: 84
End: 2024-04-24
Payer: MEDICARE

## 2024-04-24 VITALS
HEIGHT: 64 IN | BODY MASS INDEX: 25.54 KG/M2 | TEMPERATURE: 97.8 F | DIASTOLIC BLOOD PRESSURE: 78 MMHG | HEART RATE: 77 BPM | RESPIRATION RATE: 18 BRPM | WEIGHT: 149.6 LBS | SYSTOLIC BLOOD PRESSURE: 133 MMHG | OXYGEN SATURATION: 98 %

## 2024-04-24 DIAGNOSIS — C61 PROSTATE CA (HCC): ICD-10-CM

## 2024-04-24 DIAGNOSIS — K40.90 INGUINAL HERNIA OF LEFT SIDE WITHOUT OBSTRUCTION OR GANGRENE: ICD-10-CM

## 2024-04-24 DIAGNOSIS — Z00.00 MEDICARE ANNUAL WELLNESS VISIT, SUBSEQUENT: Primary | ICD-10-CM

## 2024-04-24 DIAGNOSIS — I42.9 CARDIOMYOPATHY, UNSPECIFIED TYPE (HCC): ICD-10-CM

## 2024-04-24 DIAGNOSIS — D17.1 LIPOMA OF FLANK: ICD-10-CM

## 2024-04-24 DIAGNOSIS — E78.5 DYSLIPIDEMIA: ICD-10-CM

## 2024-04-24 DIAGNOSIS — I50.22 CHRONIC SYSTOLIC (CONGESTIVE) HEART FAILURE (HCC): ICD-10-CM

## 2024-04-24 DIAGNOSIS — Z23 NEED FOR VACCINATION: ICD-10-CM

## 2024-04-24 PROCEDURE — G0439 PPPS, SUBSEQ VISIT: HCPCS | Performed by: INTERNAL MEDICINE

## 2024-04-24 PROCEDURE — G8419 CALC BMI OUT NRM PARAM NOF/U: HCPCS | Performed by: INTERNAL MEDICINE

## 2024-04-24 PROCEDURE — 90715 TDAP VACCINE 7 YRS/> IM: CPT | Performed by: INTERNAL MEDICINE

## 2024-04-24 PROCEDURE — 1036F TOBACCO NON-USER: CPT | Performed by: INTERNAL MEDICINE

## 2024-04-24 PROCEDURE — 99214 OFFICE O/P EST MOD 30 MIN: CPT | Performed by: INTERNAL MEDICINE

## 2024-04-24 PROCEDURE — 90471 IMMUNIZATION ADMIN: CPT | Performed by: INTERNAL MEDICINE

## 2024-04-24 PROCEDURE — 1123F ACP DISCUSS/DSCN MKR DOCD: CPT | Performed by: INTERNAL MEDICINE

## 2024-04-24 PROCEDURE — G8427 DOCREV CUR MEDS BY ELIG CLIN: HCPCS | Performed by: INTERNAL MEDICINE

## 2024-04-24 RX ORDER — ALBUTEROL SULFATE 2.5 MG/3ML
2.5 SOLUTION RESPIRATORY (INHALATION) EVERY 4 HOURS PRN
Qty: 120 EACH | Refills: 11 | Status: SHIPPED | OUTPATIENT
Start: 2024-04-24

## 2024-04-24 RX ORDER — DOXYCYCLINE HYCLATE 100 MG
100 TABLET ORAL 2 TIMES DAILY
Qty: 14 TABLET | Refills: 0 | Status: SHIPPED | OUTPATIENT
Start: 2024-04-24 | End: 2024-05-01

## 2024-04-24 ASSESSMENT — PATIENT HEALTH QUESTIONNAIRE - PHQ9
SUM OF ALL RESPONSES TO PHQ QUESTIONS 1-9: 0
2. FEELING DOWN, DEPRESSED OR HOPELESS: NOT AT ALL
SUM OF ALL RESPONSES TO PHQ9 QUESTIONS 1 & 2: 0
SUM OF ALL RESPONSES TO PHQ QUESTIONS 1-9: 0
SUM OF ALL RESPONSES TO PHQ QUESTIONS 1-9: 0
1. LITTLE INTEREST OR PLEASURE IN DOING THINGS: NOT AT ALL
1. LITTLE INTEREST OR PLEASURE IN DOING THINGS: NOT AT ALL
SUM OF ALL RESPONSES TO PHQ QUESTIONS 1-9: 0
SUM OF ALL RESPONSES TO PHQ9 QUESTIONS 1 & 2: 0
2. FEELING DOWN, DEPRESSED OR HOPELESS: NOT AT ALL

## 2024-04-24 NOTE — PATIENT INSTRUCTIONS
Learning About Being Active as an Older Adult  Why is being active important as you get older?     Being active is one of the best things you can do for your health. And it's never too late to start. Being active--or getting active, if you aren't already--has definite benefits. It can:  Give you more energy,  Keep your mind sharp.  Improve balance to reduce your risk of falls.  Help you manage chronic illness with fewer medicines.  No matter how old you are, how fit you are, or what health problems you have, there is a form of activity that will work for you. And the more physical activity you can do, the better your overall health will be.  What kinds of activity can help you stay healthy?  Being more active will make your daily activities easier. Physical activity includes planned exercise and things you do in daily life. There are four types of activity:  Aerobic.  Doing aerobic activity makes your heart and lungs strong.  Includes walking, dancing, and gardening.  Aim for at least 2½ hours spread throughout the week.  It improves your energy and can help you sleep better.  Muscle-strengthening.  This type of activity can help maintain muscle and strengthen bones.  Includes climbing stairs, using resistance bands, and lifting or carrying heavy loads.  Aim for at least twice a week.  It can help protect the knees and other joints.  Stretching.  Stretching gives you better range of motion in joints and muscles.  Includes upper arm stretches, calf stretches, and gentle yoga.  Aim for at least twice a week, preferably after your muscles are warmed up from other activities.  It can help you function better in daily life.  Balancing.  This helps you stay coordinated and have good posture.  Includes heel-to-toe walking, ivette chi, and certain types of yoga.  Aim for at least 3 days a week.  It can reduce your risk of falling.  Even if you have a hard time meeting the recommendations, it's better to be more active

## 2024-04-24 NOTE — PROGRESS NOTES
Chief Complaint   Patient presents with    Medicare AWV     \"Have you been to the ER, urgent care clinic since your last visit?  Hospitalized since your last visit?\"    YES - When: approximately 1  weeks ago.  Where and Why: [facility] for [facility] bite.    “Have you seen or consulted any other health care providers outside of [facility] since your last visit?”    NO            Click Here for Release of Records Request  
Medicare Annual Wellness Visit    Hussein Bustos is here for Medicare AWV    Assessment & Plan   Medicare annual wellness visit, subsequent  Recommendations for Preventive Services Due: see orders and patient instructions/AVS.  Recommended screening schedule for the next 5-10 years is provided to the patient in written form: see Patient Instructions/AVS.     No follow-ups on file.     Subjective       Patient's complete Health Risk Assessment and screening values have been reviewed and are found in Flowsheets. The following problems were reviewed today and where indicated follow up appointments were made and/or referrals ordered.    Positive Risk Factor Screenings with Interventions:                Activity, Diet, and Weight:  On average, how many days per week do you engage in moderate to strenuous exercise (like a brisk walk)?: 0 days  On average, how many minutes do you engage in exercise at this level?: 0 min    Do you eat balanced/healthy meals regularly?: Yes    Body mass index is 25.68 kg/m².      Inactivity Interventions:  See A/P for plan and any pertinent orders        Dentist Screen:  Have you seen the dentist within the past year?: (!) No    Intervention:  See A/P for any pertinent orders     Vision Screen:  Do you have difficulty driving, watching TV, or doing any of your daily activities because of your eyesight?: No  Have you had an eye exam within the past year?: (!) No  No results found.    Interventions:   See A/P for any pertinent orders    Safety:  Do you have non-slip mats or non-slip surfaces or shower bars or grab bars in your shower or bathtub?: (!) No  Interventions:  See A/P for plan and any pertinent orders     Advanced Directives:  Do you have a Living Will?: (!) No    Intervention:  has NO advanced directive - information provided                     Objective   Vitals:    04/24/24 1230   BP: (!) 166/92   Site: Left Upper Arm   Position: Sitting   Pulse: 77   Resp: 18   Temp: 97.8 °F 
Unstable Housing in the Last Year: No     No family history on file.    OBJECTIVE:    BP (!) 166/92 (Site: Left Upper Arm, Position: Sitting)   Pulse 77   Temp 97.8 °F (36.6 °C) (Oral)   Resp 18   Ht 1.626 m (5' 4\")   Wt 67.9 kg (149 lb 9.6 oz)   SpO2 98%   BMI 25.68 kg/m²   CONSTITUTIONAL: well , well nourished, appears age appropriate  EYES: perrla, eom intact  ENMT:moist mucous membranes, pharynx clear  NECK: supple. Thyroid normal  RESPIRATORY: Chest: clear bilaterally   CARDIOVASCULAR: Heart: regular rate and rhythm  GASTROINTESTINAL: Abdomen: soft, bowel sounds active  HEMATOLOGIC: no pathological lymph nodes palpated  MUSCULOSKELETAL: Extremities: no edema, pulse 1+   INTEGUMENT: No unusual rashes or suspicious skin lesions noted. Nails appear normal.  NEUROLOGIC: non-focal exam   MENTAL STATUS: alert and oriented, appropriate affect           ASSESSMENT:  1. Medicare annual wellness visit, subsequent    2. Chronic systolic (congestive) heart failure    3. Prostate CA (HCC)    4. Cardiomyopathy, unspecified type (HCC)    5. Lipoma of flank    6. Inguinal hernia of left side without obstruction or gangrene    7. Dyslipidemia      As previously discussed, he requests a full code status.    History of chronic systolic congestive heart failure that is well compensated today.    He has prostate cancer that remains in remission.    Cardiomyopathy remains stable.    His lipoma is unchanged.    Left inguinal hernia is unchanged.    He has dyslipidemia and is on Atorvastatin with LDL at goal.    For the tick bite we will give him Doxycycline for seven days. This is empiric therapy. The usual recommended dose is 20 mg within 72 hours.    He will be back to see us in about six months, sooner if he has any problems.      Patient's congestive heart failure is completely asymptomatic.  He is not on guideline directed medical therapy at this time. We will refer him to cardiology for an opinion.      He will be back

## 2025-01-29 ENCOUNTER — OFFICE VISIT (OUTPATIENT)
Facility: CLINIC | Age: 85
End: 2025-01-29

## 2025-01-29 VITALS
TEMPERATURE: 97.8 F | OXYGEN SATURATION: 94 % | SYSTOLIC BLOOD PRESSURE: 127 MMHG | HEART RATE: 101 BPM | RESPIRATION RATE: 16 BRPM | WEIGHT: 143.7 LBS | DIASTOLIC BLOOD PRESSURE: 78 MMHG | BODY MASS INDEX: 24.53 KG/M2 | HEIGHT: 64 IN

## 2025-01-29 DIAGNOSIS — E78.5 DYSLIPIDEMIA: ICD-10-CM

## 2025-01-29 DIAGNOSIS — I50.22 CHRONIC SYSTOLIC (CONGESTIVE) HEART FAILURE (HCC): ICD-10-CM

## 2025-01-29 DIAGNOSIS — R06.02 SOB (SHORTNESS OF BREATH): ICD-10-CM

## 2025-01-29 DIAGNOSIS — I42.9 CARDIOMYOPATHY, UNSPECIFIED TYPE (HCC): ICD-10-CM

## 2025-01-29 DIAGNOSIS — Z00.00 MEDICARE ANNUAL WELLNESS VISIT, SUBSEQUENT: Primary | ICD-10-CM

## 2025-01-29 DIAGNOSIS — C61 PROSTATE CA (HCC): Primary | ICD-10-CM

## 2025-01-29 DIAGNOSIS — C61 PROSTATE CA (HCC): ICD-10-CM

## 2025-01-29 LAB
ALBUMIN SERPL-MCNC: 3.5 G/DL (ref 3.5–5)
ALBUMIN/GLOB SERPL: 0.9 (ref 1.1–2.2)
ALP SERPL-CCNC: 97 U/L (ref 45–117)
ALT SERPL-CCNC: 15 U/L (ref 12–78)
ANION GAP SERPL CALC-SCNC: 5 MMOL/L (ref 2–12)
APPEARANCE UR: CLEAR
AST SERPL-CCNC: 20 U/L (ref 15–37)
BACTERIA URNS QL MICRO: NEGATIVE /HPF
BASOPHILS # BLD: 0.08 K/UL (ref 0–0.1)
BASOPHILS NFR BLD: 1.5 % (ref 0–1)
BILIRUB SERPL-MCNC: 1 MG/DL (ref 0.2–1)
BILIRUB UR QL: NEGATIVE
BUN SERPL-MCNC: 18 MG/DL (ref 6–20)
BUN/CREAT SERPL: 15 (ref 12–20)
CALCIUM SERPL-MCNC: 9.4 MG/DL (ref 8.5–10.1)
CHLORIDE SERPL-SCNC: 104 MMOL/L (ref 97–108)
CHOLEST SERPL-MCNC: 209 MG/DL
CO2 SERPL-SCNC: 30 MMOL/L (ref 21–32)
COLOR UR: ABNORMAL
CREAT SERPL-MCNC: 1.2 MG/DL (ref 0.7–1.3)
DIFFERENTIAL METHOD BLD: ABNORMAL
EOSINOPHIL # BLD: 0.09 K/UL (ref 0–0.4)
EOSINOPHIL NFR BLD: 1.7 % (ref 0–7)
EPITH CASTS URNS QL MICRO: ABNORMAL /LPF
ERYTHROCYTE [DISTWIDTH] IN BLOOD BY AUTOMATED COUNT: 11.3 % (ref 11.5–14.5)
GLOBULIN SER CALC-MCNC: 3.8 G/DL (ref 2–4)
GLUCOSE SERPL-MCNC: 83 MG/DL (ref 65–100)
GLUCOSE UR STRIP.AUTO-MCNC: NEGATIVE MG/DL
HCT VFR BLD AUTO: 44.6 % (ref 36.6–50.3)
HDLC SERPL-MCNC: 63 MG/DL
HDLC SERPL: 3.3 (ref 0–5)
HGB BLD-MCNC: 14.2 G/DL (ref 12.1–17)
HGB UR QL STRIP: NEGATIVE
IMM GRANULOCYTES # BLD AUTO: 0.01 K/UL (ref 0–0.04)
IMM GRANULOCYTES NFR BLD AUTO: 0.2 % (ref 0–0.5)
KETONES UR QL STRIP.AUTO: NEGATIVE MG/DL
LDLC SERPL CALC-MCNC: 125.8 MG/DL (ref 0–100)
LEUKOCYTE ESTERASE UR QL STRIP.AUTO: NEGATIVE
LYMPHOCYTES # BLD: 1.72 K/UL (ref 0.8–3.5)
LYMPHOCYTES NFR BLD: 32.3 % (ref 12–49)
MCH RBC QN AUTO: 34.1 PG (ref 26–34)
MCHC RBC AUTO-ENTMCNC: 31.8 G/DL (ref 30–36.5)
MCV RBC AUTO: 107 FL (ref 80–99)
MONOCYTES # BLD: 0.63 K/UL (ref 0–1)
MONOCYTES NFR BLD: 11.8 % (ref 5–13)
MUCOUS THREADS URNS QL MICRO: ABNORMAL /LPF
NEUTS SEG # BLD: 2.8 K/UL (ref 1.8–8)
NEUTS SEG NFR BLD: 52.5 % (ref 32–75)
NITRITE UR QL STRIP.AUTO: NEGATIVE
NRBC # BLD: 0 K/UL (ref 0–0.01)
NRBC BLD-RTO: 0 PER 100 WBC
NT PRO BNP: 143 PG/ML
PH UR STRIP: 6 (ref 5–8)
PLATELET # BLD AUTO: 259 K/UL (ref 150–400)
PMV BLD AUTO: 10 FL (ref 8.9–12.9)
POTASSIUM SERPL-SCNC: 4 MMOL/L (ref 3.5–5.1)
PROT SERPL-MCNC: 7.3 G/DL (ref 6.4–8.2)
PROT UR STRIP-MCNC: NEGATIVE MG/DL
PSA SERPL-MCNC: 24.8 NG/ML (ref 0.01–4)
RBC # BLD AUTO: 4.17 M/UL (ref 4.1–5.7)
RBC #/AREA URNS HPF: ABNORMAL /HPF (ref 0–5)
SODIUM SERPL-SCNC: 139 MMOL/L (ref 136–145)
SP GR UR REFRACTOMETRY: 1.02 (ref 1–1.03)
TRIGL SERPL-MCNC: 101 MG/DL
UROBILINOGEN UR QL STRIP.AUTO: 0.2 EU/DL (ref 0.2–1)
VLDLC SERPL CALC-MCNC: 20.2 MG/DL
WBC # BLD AUTO: 5.3 K/UL (ref 4.1–11.1)
WBC URNS QL MICRO: ABNORMAL /HPF (ref 0–4)

## 2025-01-29 RX ORDER — ATORVASTATIN CALCIUM 20 MG/1
20 TABLET, FILM COATED ORAL DAILY
Qty: 90 TABLET | Refills: 3 | Status: SHIPPED | OUTPATIENT
Start: 2025-01-29

## 2025-01-29 SDOH — ECONOMIC STABILITY: FOOD INSECURITY: WITHIN THE PAST 12 MONTHS, THE FOOD YOU BOUGHT JUST DIDN'T LAST AND YOU DIDN'T HAVE MONEY TO GET MORE.: NEVER TRUE

## 2025-01-29 SDOH — ECONOMIC STABILITY: FOOD INSECURITY: WITHIN THE PAST 12 MONTHS, YOU WORRIED THAT YOUR FOOD WOULD RUN OUT BEFORE YOU GOT MONEY TO BUY MORE.: NEVER TRUE

## 2025-01-29 ASSESSMENT — PATIENT HEALTH QUESTIONNAIRE - PHQ9
SUM OF ALL RESPONSES TO PHQ9 QUESTIONS 1 & 2: 0
SUM OF ALL RESPONSES TO PHQ QUESTIONS 1-9: 0
SUM OF ALL RESPONSES TO PHQ QUESTIONS 1-9: 0
2. FEELING DOWN, DEPRESSED OR HOPELESS: NOT AT ALL
SUM OF ALL RESPONSES TO PHQ QUESTIONS 1-9: 0
SUM OF ALL RESPONSES TO PHQ QUESTIONS 1-9: 0
1. LITTLE INTEREST OR PLEASURE IN DOING THINGS: NOT AT ALL

## 2025-01-29 ASSESSMENT — LIFESTYLE VARIABLES
HOW MANY STANDARD DRINKS CONTAINING ALCOHOL DO YOU HAVE ON A TYPICAL DAY: PATIENT DOES NOT DRINK
HOW OFTEN DO YOU HAVE A DRINK CONTAINING ALCOHOL: NEVER

## 2025-01-29 NOTE — PROGRESS NOTES
Medicare Annual Wellness Visit    Hussein Bustos is here for Medicare AWV    Assessment & Plan   Medicare annual wellness visit, subsequent     No follow-ups on file.     Subjective       Patient's complete Health Risk Assessment and screening values have been reviewed and are found in Flowsheets. The following problems were reviewed today and where indicated follow up appointments were made and/or referrals ordered.    Positive Risk Factor Screenings with Interventions:              Inactivity:  On average, how many days per week do you engage in moderate to strenuous exercise (like a brisk walk)?: 2 days (!) Abnormal  On average, how many minutes do you engage in exercise at this level?: 20 min  Interventions:  See A/P for plan and any pertinent orders      Dentist Screen:  Have you seen the dentist within the past year?: (!) No    Intervention:  See A/P for any pertinent orders        Advanced Directives:  Do you have a Living Will?: (!) No    Intervention:  has NO advanced directive - information provided                     Objective   Vitals:    01/29/25 1118   BP: 127/78   Site: Left Upper Arm   Position: Sitting   Cuff Size: Small Adult   Pulse: (!) 101   Resp: 16   Temp: 97.8 °F (36.6 °C)   TempSrc: Oral   SpO2: 94%   Weight: 65.2 kg (143 lb 11.2 oz)   Height: 1.626 m (5' 4\")      Body mass index is 24.67 kg/m².                    Allergies   Allergen Reactions    Rosuvastatin Myalgia     Prior to Visit Medications    Medication Sig Taking? Authorizing Provider   albuterol (PROVENTIL) (2.5 MG/3ML) 0.083% nebulizer solution Take 3 mLs by nebulization every 4 hours as needed for Wheezing Yes Alfonzo Lorenz MD   atorvastatin (LIPITOR) 10 MG tablet Take 1 tablet by mouth daily Yes Alfonzo Lorenz MD   ASPIRIN LOW DOSE 81 MG EC tablet TAKE 1 TABLET BY MOUTH EVERY DAY Yes Alfonzo Lorenz MD       [facility] (Including outside providers/suppliers regularly involved in providing care):   Patient

## 2025-01-29 NOTE — PROGRESS NOTES
SPORTS MEDICINE AND PRIMARY CARE  Alfonzo Lorenz MD, FACP, CMD  [address] [facility] [address]  Phone:  [phone]  Fax: [phone]       Chief Complaint   Patient presents with    Medicare AW   .      SUBJECTIVE:  History of Present Illness         Hussein Bustos is a 85 y.o. male patient returns today with a known history of chronic systolic heart failure, prostate cancer, dyslipidemia, and cardiomyopathy. He is seen for evaluation.    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 He has been requested by the V to provide a vision report, which he has obtained from his ophthalmologist. He has also undergone an eye examination at the ENT's office, the results of which were satisfactory. 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    He expresses concern about the need for new tubing for his albuterol machine, as the current one appears discolored, although it remains function    MEDICATIONS  atorvastatin 10 mg daily       Current Outpatient Medications   Medication Sig Dispense Refill    albuterol (PROVENTIL) (2.5 MG/3ML) 0.083% nebulizer solution Take 3 mLs by nebulization every 4 hours as needed for Wheezing 120 each 11    atorvastatin (LIPITOR) 10 MG tablet Take 1 tablet by mouth daily 90 tablet 3    ASPIRIN LOW DOSE 81 MG EC tablet TAKE 1 TABLET BY MOUTH EVERY DAY 90 tablet 3     No current facility-administered medications for this visit.     Past Medical History:   Diagnosis Date    1958 1998    [place]    Asthmatic bronchitis     Back pain     Brachycephaly 11/2006    Cancer of prostate w/high recur risk (T3a or Hafsa 8-10 or PSA>20)     Cardiomyopathy (HCC) 03/15/2022    EF of 35 - 40%    Chronic cerebral ischemia 08/15/2022    Cayden Chang DO    Colonoscopy refused 12/01/2016    not this year    Dyslipidemia     Fall at home     Hard palate abscess     Inguinal hernia of left side without obstruction or

## 2025-02-21 ENCOUNTER — TELEPHONE (OUTPATIENT)
Facility: CLINIC | Age: 85
End: 2025-02-21

## 2025-02-21 NOTE — TELEPHONE ENCOUNTER
----- Message from Dr. Alfonzo Lorenz MD sent at 1/29/2025  8:42 PM EST -----  Advised patient need to see urologist

## 2025-07-24 PROBLEM — R97.20 ELEVATED PSA: Status: ACTIVE | Noted: 2025-07-24

## 2025-07-28 ENCOUNTER — OFFICE VISIT (OUTPATIENT)
Age: 85
End: 2025-07-28
Payer: MEDICARE

## 2025-07-28 VITALS — SYSTOLIC BLOOD PRESSURE: 124 MMHG | DIASTOLIC BLOOD PRESSURE: 81 MMHG | HEART RATE: 50 BPM

## 2025-07-28 DIAGNOSIS — R97.20 ELEVATED PSA: ICD-10-CM

## 2025-07-28 DIAGNOSIS — R97.20 ELEVATED PROSTATE SPECIFIC ANTIGEN GREATER THAN OR EQUAL TO 20 NG/ML: ICD-10-CM

## 2025-07-28 DIAGNOSIS — C61 PROSTATE CANCER (HCC): Primary | ICD-10-CM

## 2025-07-28 LAB
BILIRUBIN, URINE, POC: NEGATIVE
BLOOD URINE, POC: NEGATIVE
GLUCOSE URINE, POC: NEGATIVE
KETONES, URINE, POC: NORMAL
LEUKOCYTE ESTERASE, URINE, POC: NEGATIVE
NITRITE, URINE, POC: NEGATIVE
PH, URINE, POC: 5.5 (ref 4.6–8)
PROTEIN,URINE, POC: NORMAL
SPECIFIC GRAVITY, URINE, POC: 1.02 (ref 1–1.03)
URINALYSIS CLARITY, POC: CLEAR
URINALYSIS COLOR, POC: YELLOW
UROBILINOGEN, POC: NORMAL

## 2025-07-28 PROCEDURE — G8427 DOCREV CUR MEDS BY ELIG CLIN: HCPCS | Performed by: UROLOGY

## 2025-07-28 PROCEDURE — 1123F ACP DISCUSS/DSCN MKR DOCD: CPT | Performed by: UROLOGY

## 2025-07-28 PROCEDURE — 1159F MED LIST DOCD IN RCRD: CPT | Performed by: UROLOGY

## 2025-07-28 PROCEDURE — 99204 OFFICE O/P NEW MOD 45 MIN: CPT | Performed by: UROLOGY

## 2025-07-28 PROCEDURE — 1126F AMNT PAIN NOTED NONE PRSNT: CPT | Performed by: UROLOGY

## 2025-07-28 PROCEDURE — 1036F TOBACCO NON-USER: CPT | Performed by: UROLOGY

## 2025-07-28 PROCEDURE — 81003 URINALYSIS AUTO W/O SCOPE: CPT | Performed by: UROLOGY

## 2025-07-28 PROCEDURE — G8420 CALC BMI NORM PARAMETERS: HCPCS | Performed by: UROLOGY

## 2025-07-28 RX ORDER — ROSUVASTATIN CALCIUM 20 MG/1
TABLET, COATED ORAL
COMMUNITY
End: 2025-07-28

## 2025-07-28 RX ORDER — CIPROFLOXACIN 500 MG/1
500 TABLET, FILM COATED ORAL 2 TIMES DAILY
Qty: 10 TABLET | Refills: 0 | Status: SHIPPED | OUTPATIENT
Start: 2025-07-28 | End: 2025-08-02

## 2025-07-28 NOTE — PROGRESS NOTES
Chief Complaint   Patient presents with    Follow-up           1. Have you been to the ER, urgent care clinic since your last visit?  Hospitalized since your last visit?    2. Have you seen or consulted any other health care providers outside of the [facility] since your last visit?  Include any pap smears or colon screening.

## 2025-07-28 NOTE — PROGRESS NOTES
HISTORY OF PRESENT ILLNESS  Hussein Bustos is a 85 y.o. male   Patient came in today has a history of prostate cancer.  He underwent radiation therapy years ago.  He was seen by Dr. Jakob Evans and followed up by radiation oncologist.  PSA went down now it started to come back up to thousand 23 PSA is 6.1 PSA in January this year is 24.8.  Denies weight loss bone pain no real problems urinating.  Was found to have a firm prostate and referred back to us  1. Prostate cancer (HCC)  -     ciprofloxacin (CIPRO) 500 MG tablet; Take 1 tablet by mouth 2 times daily for 10 doses 1 p.o. twice daily start night before biopsy take all the pills, Disp-10 tablet, R-0Normal  -     NM BONE SCAN WHOLE BODY  -     CT ABDOMEN PELVIS WO CONTRAST Additional Contrast? Radiologist Recommendation  2. Elevated PSA  Overview:  Component  Ref Range & Units (hover) 1/29/25 1157 8/2/23 1347 4/1/21 1409   PSA 24.8 High  6.1 High  CM 1.3 CM     Orders:  -     AMB POC URINALYSIS DIP STICK AUTO W/O MICRO  -     ciprofloxacin (CIPRO) 500 MG tablet; Take 1 tablet by mouth 2 times daily for 10 doses 1 p.o. twice daily start night before biopsy take all the pills, Disp-10 tablet, R-0Normal  -     NM BONE SCAN WHOLE BODY  -     CT ABDOMEN PELVIS WO CONTRAST Additional Contrast? Radiologist Recommendation  3. Elevated prostate specific antigen greater than or equal to 20 ng/ml  Overview:  Component  Ref Range & Units (hover) 1/29/25 1157 8/2/23 1347 4/1/21 1409   PSA 24.8 High  6.1 High  CM 1.3 CM           PAST MEDICAL HISTORY  PMHx (including negatives):  has a past medical history of 1958, Asthmatic bronchitis, Back pain, Brachycephaly, Cancer of prostate w/high recur risk (T3a or Hafsa 8-10 or PSA>20), Cardiomyopathy (HCC), Chronic cerebral ischemia, Colonoscopy refused, Dyslipidemia, Fall at home, Hard palate abscess, Inguinal hernia of left side without obstruction or gangrene, Lipoma of flank, Prostate CA (HCC), PSA elevation, S/P colonoscopy,

## 2025-07-29 ENCOUNTER — OFFICE VISIT (OUTPATIENT)
Facility: CLINIC | Age: 85
End: 2025-07-29
Payer: MEDICARE

## 2025-07-29 VITALS
HEIGHT: 64 IN | RESPIRATION RATE: 16 BRPM | OXYGEN SATURATION: 95 % | SYSTOLIC BLOOD PRESSURE: 132 MMHG | TEMPERATURE: 97.8 F | BODY MASS INDEX: 24.62 KG/M2 | HEART RATE: 91 BPM | WEIGHT: 144.2 LBS | DIASTOLIC BLOOD PRESSURE: 84 MMHG

## 2025-07-29 DIAGNOSIS — E78.5 DYSLIPIDEMIA: ICD-10-CM

## 2025-07-29 DIAGNOSIS — I50.22 CHRONIC SYSTOLIC (CONGESTIVE) HEART FAILURE (HCC): Primary | ICD-10-CM

## 2025-07-29 DIAGNOSIS — R06.02 SOB (SHORTNESS OF BREATH): ICD-10-CM

## 2025-07-29 DIAGNOSIS — I42.9 CARDIOMYOPATHY, UNSPECIFIED TYPE (HCC): ICD-10-CM

## 2025-07-29 DIAGNOSIS — C61 PROSTATE CA (HCC): ICD-10-CM

## 2025-07-29 PROCEDURE — 1036F TOBACCO NON-USER: CPT | Performed by: INTERNAL MEDICINE

## 2025-07-29 PROCEDURE — 1126F AMNT PAIN NOTED NONE PRSNT: CPT | Performed by: INTERNAL MEDICINE

## 2025-07-29 PROCEDURE — 1159F MED LIST DOCD IN RCRD: CPT | Performed by: INTERNAL MEDICINE

## 2025-07-29 PROCEDURE — 1123F ACP DISCUSS/DSCN MKR DOCD: CPT | Performed by: INTERNAL MEDICINE

## 2025-07-29 PROCEDURE — G8427 DOCREV CUR MEDS BY ELIG CLIN: HCPCS | Performed by: INTERNAL MEDICINE

## 2025-07-29 PROCEDURE — 99214 OFFICE O/P EST MOD 30 MIN: CPT | Performed by: INTERNAL MEDICINE

## 2025-07-29 PROCEDURE — G8420 CALC BMI NORM PARAMETERS: HCPCS | Performed by: INTERNAL MEDICINE

## 2025-07-29 SDOH — ECONOMIC STABILITY: FOOD INSECURITY: WITHIN THE PAST 12 MONTHS, THE FOOD YOU BOUGHT JUST DIDN'T LAST AND YOU DIDN'T HAVE MONEY TO GET MORE.: NEVER TRUE

## 2025-07-29 SDOH — ECONOMIC STABILITY: FOOD INSECURITY: WITHIN THE PAST 12 MONTHS, YOU WORRIED THAT YOUR FOOD WOULD RUN OUT BEFORE YOU GOT MONEY TO BUY MORE.: NEVER TRUE

## 2025-07-29 ASSESSMENT — PATIENT HEALTH QUESTIONNAIRE - PHQ9
SUM OF ALL RESPONSES TO PHQ QUESTIONS 1-9: 0
SUM OF ALL RESPONSES TO PHQ QUESTIONS 1-9: 0
1. LITTLE INTEREST OR PLEASURE IN DOING THINGS: NOT AT ALL
SUM OF ALL RESPONSES TO PHQ QUESTIONS 1-9: 0
SUM OF ALL RESPONSES TO PHQ QUESTIONS 1-9: 0
2. FEELING DOWN, DEPRESSED OR HOPELESS: NOT AT ALL

## 2025-07-29 NOTE — PROGRESS NOTES
Chief Complaint   Patient presents with    Check-Up     Have you been to the ER, urgent care clinic since your last visit?  Hospitalized since your last visit?   NO    Have you seen or consulted any other health care providers outside our system since your last visit?   NO

## 2025-07-29 NOTE — PROGRESS NOTES
SPORTS MEDICINE AND PRIMARY CARE  Alfonzo Lorenz MD, FACP, CMD  [address] [facility] [address]  Phone:  [phone]  Fax: [phone]       Chief Complaint   Patient presents with    Check-Up   .      SUBJECTIVE:       History of Present Illness  The patient is an 85-year-old black male with a history of prostate cancer, chronic systolic heart failure, cardiomyopathy, and dyslipidemia. He is seen for evaluation. Since his last visit, he was seen by Wade Mitchell MD, urology for his prostate cancer and elevated PSA. Plans include a nuclear medicine body scan, CT abdomen and pelvis, and a prostate biopsy.    His grandson reports that they have been reviewing his MyChart to better understand his current health status. They are particularly concerned about his prostate issues, which led to a referral to a urologist. However, due to an unsatisfactory experience, he sought care at another facility yesterday where a biopsy was recommended. His PSA levels have been rising since 08/2023, prompting a referral to a urologist. He recalls visiting a urologist in [place] on 08/03/2023, but no treatment was recommended at that time. He also mentions seeing Dr. Saucedo in 2006, who did not initiate any treatment. About 1 to 2 weeks ago, he visited Dr. Cline who suggested a PET scan. He has not yet scheduled this scan. He underwent radiation therapy in 2006, after which his PSA levels decreased to 1.3. However, they have since increased to 20. He has not had a biopsy since his radiation therapy and surgery. The doctor informed him that the cause of the elevated PSA could be either an infection or cancer, hence the need for a biopsy.    His grandson also mentions that he has not been receiving treatment for his heart failure. He has seen several cardiologists and wore a heart monitor for 2 weeks. He is not currently under the care of a cardiologist. He recalls seeing a doctor less than a month ago but does not

## 2025-07-30 LAB
ALBUMIN SERPL-MCNC: 3.6 G/DL (ref 3.5–5.2)
ALBUMIN/GLOB SERPL: 1.3 (ref 1.1–2.2)
ALP SERPL-CCNC: 76 U/L (ref 40–129)
ALT SERPL-CCNC: 12 U/L (ref 10–50)
ANION GAP SERPL CALC-SCNC: 10 MMOL/L (ref 2–14)
AST SERPL-CCNC: 21 U/L (ref 10–50)
BASOPHILS # BLD: 0.07 K/UL (ref 0–0.1)
BASOPHILS NFR BLD: 1.3 % (ref 0–1)
BILIRUB SERPL-MCNC: 0.7 MG/DL (ref 0–1.2)
BUN SERPL-MCNC: 18 MG/DL (ref 8–23)
CALCIUM SERPL-MCNC: 9.2 MG/DL (ref 8.8–10.2)
CHLORIDE SERPL-SCNC: 106 MMOL/L (ref 98–107)
CHOLEST SERPL-MCNC: 183 MG/DL (ref 0–200)
CO2 SERPL-SCNC: 27 MMOL/L (ref 20–29)
CREAT SERPL-MCNC: 1.2 MG/DL (ref 0.7–1.2)
DIFFERENTIAL METHOD BLD: ABNORMAL
EOSINOPHIL # BLD: 0.1 K/UL (ref 0–0.4)
EOSINOPHIL NFR BLD: 1.9 % (ref 0–7)
ERYTHROCYTE [DISTWIDTH] IN BLOOD BY AUTOMATED COUNT: 11.5 % (ref 11.5–14.5)
GLOBULIN SER CALC-MCNC: 2.8 G/DL (ref 2–4)
GLUCOSE SERPL-MCNC: 82 MG/DL (ref 65–100)
HCT VFR BLD AUTO: 43.9 % (ref 36.6–50.3)
HDLC SERPL-MCNC: 55 MG/DL (ref 40–60)
HDLC SERPL: 3.3
HGB BLD-MCNC: 13.8 G/DL (ref 12.1–17)
IMM GRANULOCYTES # BLD AUTO: 0.01 K/UL (ref 0–0.04)
IMM GRANULOCYTES NFR BLD AUTO: 0.2 % (ref 0–0.5)
LDLC SERPL CALC-MCNC: 109 MG/DL
LYMPHOCYTES # BLD: 1.91 K/UL (ref 0.8–3.5)
LYMPHOCYTES NFR BLD: 35.4 % (ref 12–49)
MCH RBC QN AUTO: 34.4 PG (ref 26–34)
MCHC RBC AUTO-ENTMCNC: 31.4 G/DL (ref 30–36.5)
MCV RBC AUTO: 109.5 FL (ref 80–99)
MONOCYTES # BLD: 0.56 K/UL (ref 0–1)
MONOCYTES NFR BLD: 10.4 % (ref 5–13)
NEUTS SEG # BLD: 2.75 K/UL (ref 1.8–8)
NEUTS SEG NFR BLD: 50.8 % (ref 32–75)
NRBC # BLD: 0 K/UL (ref 0–0.01)
NRBC BLD-RTO: 0 PER 100 WBC
NT PRO BNP: 84 PG/ML (ref 0–450)
PLATELET # BLD AUTO: 260 K/UL (ref 150–400)
PMV BLD AUTO: 10.3 FL (ref 8.9–12.9)
POTASSIUM SERPL-SCNC: 4.4 MMOL/L (ref 3.5–5.1)
PROT SERPL-MCNC: 6.4 G/DL (ref 6.4–8.3)
RBC # BLD AUTO: 4.01 M/UL (ref 4.1–5.7)
RBC MORPH BLD: ABNORMAL
SODIUM SERPL-SCNC: 143 MMOL/L (ref 136–145)
TRIGL SERPL-MCNC: 94 MG/DL (ref 0–150)
VLDLC SERPL CALC-MCNC: 19 MG/DL
WBC # BLD AUTO: 5.4 K/UL (ref 4.1–11.1)

## 2025-07-31 LAB — VIT B12 SERPL-MCNC: 911 PG/ML (ref 232–1245)

## 2025-08-01 PROBLEM — R97.20 ELEVATED PSA: Status: ACTIVE | Noted: 2025-07-28

## 2025-08-14 RX ORDER — ASPIRIN 81 MG/1
81 TABLET, COATED ORAL DAILY
Qty: 90 TABLET | Refills: 3 | Status: SHIPPED | OUTPATIENT
Start: 2025-08-14

## 2025-08-20 ENCOUNTER — TELEPHONE (OUTPATIENT)
Age: 85
End: 2025-08-20

## 2025-08-25 RX ORDER — CIPROFLOXACIN 500 MG/1
500 TABLET, FILM COATED ORAL 2 TIMES DAILY
Qty: 10 TABLET | Refills: 0 | Status: SHIPPED | OUTPATIENT
Start: 2025-08-25 | End: 2025-08-30